# Patient Record
Sex: FEMALE | Race: OTHER | NOT HISPANIC OR LATINO
[De-identification: names, ages, dates, MRNs, and addresses within clinical notes are randomized per-mention and may not be internally consistent; named-entity substitution may affect disease eponyms.]

---

## 2018-03-27 PROBLEM — Z00.00 ENCOUNTER FOR PREVENTIVE HEALTH EXAMINATION: Status: ACTIVE | Noted: 2018-03-27

## 2018-04-11 ENCOUNTER — APPOINTMENT (OUTPATIENT)
Dept: SPINE | Facility: CLINIC | Age: 48
End: 2018-04-11
Payer: COMMERCIAL

## 2018-04-11 VITALS
DIASTOLIC BLOOD PRESSURE: 69 MMHG | HEART RATE: 76 BPM | BODY MASS INDEX: 22.23 KG/M2 | WEIGHT: 140 LBS | HEIGHT: 66.5 IN | SYSTOLIC BLOOD PRESSURE: 107 MMHG

## 2018-04-11 PROCEDURE — 99244 OFF/OP CNSLTJ NEW/EST MOD 40: CPT

## 2018-04-11 RX ORDER — NORETHINDRONE 0.35 MG/1
0.35 TABLET ORAL
Refills: 0 | Status: ACTIVE | COMMUNITY

## 2018-05-30 ENCOUNTER — OTHER (OUTPATIENT)
Age: 48
End: 2018-05-30

## 2018-06-13 ENCOUNTER — APPOINTMENT (OUTPATIENT)
Dept: SPINE | Facility: CLINIC | Age: 48
End: 2018-06-13

## 2018-06-20 ENCOUNTER — APPOINTMENT (OUTPATIENT)
Dept: SPINE | Facility: CLINIC | Age: 48
End: 2018-06-20
Payer: COMMERCIAL

## 2018-06-20 VITALS
HEART RATE: 87 BPM | WEIGHT: 142 LBS | DIASTOLIC BLOOD PRESSURE: 69 MMHG | SYSTOLIC BLOOD PRESSURE: 105 MMHG | HEIGHT: 66.5 IN | BODY MASS INDEX: 22.55 KG/M2

## 2018-06-20 PROCEDURE — 99214 OFFICE O/P EST MOD 30 MIN: CPT

## 2018-06-21 ENCOUNTER — TRANSCRIPTION ENCOUNTER (OUTPATIENT)
Age: 48
End: 2018-06-21

## 2018-08-28 ENCOUNTER — CLINICAL ADVICE (OUTPATIENT)
Age: 48
End: 2018-08-28

## 2018-08-29 ENCOUNTER — APPOINTMENT (OUTPATIENT)
Dept: CT IMAGING | Facility: CLINIC | Age: 48
End: 2018-08-29
Payer: COMMERCIAL

## 2018-08-29 ENCOUNTER — OUTPATIENT (OUTPATIENT)
Dept: OUTPATIENT SERVICES | Facility: HOSPITAL | Age: 48
LOS: 1 days | End: 2018-08-29
Payer: COMMERCIAL

## 2018-08-29 DIAGNOSIS — Z00.8 ENCOUNTER FOR OTHER GENERAL EXAMINATION: ICD-10-CM

## 2018-08-29 PROCEDURE — 70486 CT MAXILLOFACIAL W/O DYE: CPT | Mod: 26

## 2018-08-29 PROCEDURE — 70486 CT MAXILLOFACIAL W/O DYE: CPT

## 2018-10-09 ENCOUNTER — RX RENEWAL (OUTPATIENT)
Age: 48
End: 2018-10-09

## 2018-11-05 ENCOUNTER — CLINICAL ADVICE (OUTPATIENT)
Age: 48
End: 2018-11-05

## 2019-04-10 ENCOUNTER — OTHER (OUTPATIENT)
Age: 49
End: 2019-04-10

## 2019-05-24 ENCOUNTER — RESULT REVIEW (OUTPATIENT)
Age: 49
End: 2019-05-24

## 2019-05-29 ENCOUNTER — APPOINTMENT (OUTPATIENT)
Dept: SPINE | Facility: CLINIC | Age: 49
End: 2019-05-29
Payer: COMMERCIAL

## 2019-05-29 VITALS
HEART RATE: 76 BPM | DIASTOLIC BLOOD PRESSURE: 43 MMHG | WEIGHT: 143 LBS | BODY MASS INDEX: 22.71 KG/M2 | SYSTOLIC BLOOD PRESSURE: 110 MMHG | HEIGHT: 66.5 IN | TEMPERATURE: 98.6 F | RESPIRATION RATE: 16 BRPM

## 2019-05-29 PROCEDURE — 99214 OFFICE O/P EST MOD 30 MIN: CPT

## 2019-06-12 ENCOUNTER — TRANSCRIPTION ENCOUNTER (OUTPATIENT)
Age: 49
End: 2019-06-12

## 2019-06-18 ENCOUNTER — OUTPATIENT (OUTPATIENT)
Dept: OUTPATIENT SERVICES | Facility: HOSPITAL | Age: 49
LOS: 1 days | End: 2019-06-18
Payer: COMMERCIAL

## 2019-06-18 VITALS
RESPIRATION RATE: 16 BRPM | TEMPERATURE: 98 F | WEIGHT: 141.1 LBS | HEIGHT: 67 IN | OXYGEN SATURATION: 99 % | SYSTOLIC BLOOD PRESSURE: 123 MMHG | DIASTOLIC BLOOD PRESSURE: 76 MMHG | HEART RATE: 78 BPM

## 2019-06-18 DIAGNOSIS — Z29.9 ENCOUNTER FOR PROPHYLACTIC MEASURES, UNSPECIFIED: ICD-10-CM

## 2019-06-18 DIAGNOSIS — M54.12 RADICULOPATHY, CERVICAL REGION: ICD-10-CM

## 2019-06-18 DIAGNOSIS — S02.2XXA FRACTURE OF NASAL BONES, INITIAL ENCOUNTER FOR CLOSED FRACTURE: Chronic | ICD-10-CM

## 2019-06-18 DIAGNOSIS — Z98.890 OTHER SPECIFIED POSTPROCEDURAL STATES: Chronic | ICD-10-CM

## 2019-06-18 DIAGNOSIS — M54.12 RADICULOPATHY, CERVICAL REGION: Chronic | ICD-10-CM

## 2019-06-18 DIAGNOSIS — Z98.891 HISTORY OF UTERINE SCAR FROM PREVIOUS SURGERY: Chronic | ICD-10-CM

## 2019-06-18 DIAGNOSIS — Z01.818 ENCOUNTER FOR OTHER PREPROCEDURAL EXAMINATION: ICD-10-CM

## 2019-06-18 DIAGNOSIS — M50.20 OTHER CERVICAL DISC DISPLACEMENT, UNSPECIFIED CERVICAL REGION: ICD-10-CM

## 2019-06-18 LAB
BLD GP AB SCN SERPL QL: NEGATIVE — SIGNIFICANT CHANGE UP
RH IG SCN BLD-IMP: NEGATIVE — SIGNIFICANT CHANGE UP

## 2019-06-18 PROCEDURE — 85027 COMPLETE CBC AUTOMATED: CPT

## 2019-06-18 PROCEDURE — G0463: CPT

## 2019-06-18 PROCEDURE — 87641 MR-STAPH DNA AMP PROBE: CPT

## 2019-06-18 PROCEDURE — 86850 RBC ANTIBODY SCREEN: CPT

## 2019-06-18 PROCEDURE — 86901 BLOOD TYPING SEROLOGIC RH(D): CPT

## 2019-06-18 PROCEDURE — 87640 STAPH A DNA AMP PROBE: CPT

## 2019-06-18 PROCEDURE — 86900 BLOOD TYPING SEROLOGIC ABO: CPT

## 2019-06-18 RX ORDER — CEFAZOLIN SODIUM 1 G
2000 VIAL (EA) INJECTION ONCE
Refills: 0 | Status: DISCONTINUED | OUTPATIENT
Start: 2019-06-27 | End: 2019-06-28

## 2019-06-18 NOTE — H&P PST ADULT - MUSCULOSKELETAL
detailed exam no joint swelling/left hand/ROM intact/no joint erythema/normal strength/diminished strength/no joint warmth/no calf tenderness details…

## 2019-06-18 NOTE — H&P PST ADULT - NSICDXPROBLEM_GEN_ALL_CORE_FT
PROBLEM DIAGNOSES  Problem: Cervical radiculopathy  Assessment and Plan: C4-6 ACDF     Problem: Prophylactic measure  Assessment and Plan: The Caprini score indicates this patient is at risk for a VTE event (score 3-5).  Most surgical patients in this group would benefit from pharmacologic prophylaxis.  The surgical team will determine the balance between VTE risk and bleeding risk

## 2019-06-18 NOTE — H&P PST ADULT - NSICDXPASTSURGICALHX_GEN_ALL_CORE_FT
PAST SURGICAL HISTORY:  Cervical radiculopathy at C6 h/o ACDF     H/O:      History of D&C endometrial thickness    Nasal bone fracture h/o repair    Status post liver transplant with choledochocholedochostomy with stent 2001

## 2019-06-18 NOTE — H&P PST ADULT - MS GEN HX ROS MEA POS PC
You can access the [facility] Patient Portal, offered by [facility], by registering with the following website: [email or web address]
arm pain L/left hand for a year/neck pain

## 2019-06-18 NOTE — H&P PST ADULT - NSICDXPASTMEDICALHX_GEN_ALL_CORE_FT
PAST MEDICAL HISTORY:  Cervical radiculopathy C6-7 ACDF 15 YEARS AGO    UTI (urinary tract infection), bacterial 2017, treated

## 2019-06-18 NOTE — H&P PST ADULT - HISTORY OF PRESENT ILLNESS
48 year old female  with h/o C-C7 ACDF in 2004, reports having neck pain, arm pain with numbness & tingling more in left hand for about a year, being seen in PST for evaluation prior to scheduled C4-6 ACDF on 06/27/19.

## 2019-06-18 NOTE — H&P PST ADULT - NSANTHOSAYNRD_GEN_A_CORE
No. FRITZ screening performed.  STOP BANG Legend: 0-2 = LOW Risk; 3-4 = INTERMEDIATE Risk; 5-8 = HIGH Risk

## 2019-06-18 NOTE — H&P PST ADULT - ASSESSMENT
cervical radiculopathy  EMELIAI VTE 2.0 SCORE [CLOT updated 2019]    AGE RELATED RISK FACTORS                                                       MOBILITY RELATED FACTORS  [ x] Age 41-60 years                                            (1 Point)                    [ ] Bed rest                                                        (1 Point)  [ ] Age: 61-74 years                                           (2 Points)                  [ ] Plaster cast                                                   (2 Points)  [ ] Age= 75 years                                              (3 Points)                    [ ] Bed bound for more than 72 hours                 (2 Points)    DISEASE RELATED RISK FACTORS                                               GENDER SPECIFIC FACTORS  [ ] Edema in the lower extremities                       (1 Point)              [ ] Pregnancy                                                     (1 Point)  [ ] Varicose veins                                               (1 Point)                     [ ] Post-partum < 6 weeks                                   (1 Point)             [ ] BMI > 25 Kg/m2                                            (1 Point)                     [ ] Hormonal therapy  or oral contraception          (1 Point)                 [ ] Sepsis (in the previous month)                        (1 Point)               [ ] History of pregnancy complications                 (1 point)  [ ] Pneumonia or serious lung disease                                               [ ] Unexplained or recurrent                     (1 Point)           (in the previous month)                               (1 Point)  [ ] Abnormal pulmonary function test                     (1 Point)                 SURGERY RELATED RISK FACTORS  [ ] Acute myocardial infarction                              (1 Point)               [ ]  Section                                             (1 Point)  [ ] Congestive heart failure (in the previous month)  (1 Point)      [ ] Minor surgery                                                  (1 Point)   [ ] Inflammatory bowel disease                             (1 Point)               [ ] Arthroscopic surgery                                        (2 Points)  [ ] Central venous access                                      (2 Points)                [x ] General surgery lasting more than 45 minutes (2 points)  [ ] Malignancy- Present or previous                   (2 Points)                [ ] Elective arthroplasty                                         (5 points)    [ ] Stroke (in the previous month)                          (5 Points)                                                                                                                                                           HEMATOLOGY RELATED FACTORS                                                 TRAUMA RELATED RISK FACTORS  [ ] Prior episodes of VTE                                     (3 Points)                [ ] Fracture of the hip, pelvis, or leg                       (5 Points)  [ ] Positive family history for VTE                         (3 Points)             [ ] Acute spinal cord injury (in the previous month)  (5 Points)  [ ] Prothrombin 98729 A                                     (3 Points)               [ ] Paralysis  (less than 1 month)                             (5 Points)  [ ] Factor V Leiden                                             (3 Points)                  [ ] Multiple Trauma within 1 month                        (5 Points)  [ ] Lupus anticoagulants                                     (3 Points)                                                           [ ] Anticardiolipin antibodies                               (3 Points)                                                       [ ] High homocysteine in the blood                      (3 Points)                                             [ ] Other congenital or acquired thrombophilia      (3 Points)                                                [ ] Heparin induced thrombocytopenia                  (3 Points)                                     Total Score [        3  ]

## 2019-06-19 LAB
HCT VFR BLD CALC: 43.9 % — SIGNIFICANT CHANGE UP (ref 34.5–45)
HGB BLD-MCNC: 14.1 G/DL — SIGNIFICANT CHANGE UP (ref 11.5–15.5)
MCHC RBC-ENTMCNC: 31.1 PG — SIGNIFICANT CHANGE UP (ref 27–34)
MCHC RBC-ENTMCNC: 32.1 GM/DL — SIGNIFICANT CHANGE UP (ref 32–36)
MCV RBC AUTO: 96.7 FL — SIGNIFICANT CHANGE UP (ref 80–100)
MRSA PCR RESULT.: SIGNIFICANT CHANGE UP
PLATELET # BLD AUTO: 240 K/UL — SIGNIFICANT CHANGE UP (ref 150–400)
RBC # BLD: 4.54 M/UL — SIGNIFICANT CHANGE UP (ref 3.8–5.2)
RBC # FLD: 12.1 % — SIGNIFICANT CHANGE UP (ref 10.3–14.5)
S AUREUS DNA NOSE QL NAA+PROBE: SIGNIFICANT CHANGE UP
WBC # BLD: 9.55 K/UL — SIGNIFICANT CHANGE UP (ref 3.8–10.5)
WBC # FLD AUTO: 9.55 K/UL — SIGNIFICANT CHANGE UP (ref 3.8–10.5)

## 2019-06-20 PROBLEM — M54.12 RADICULOPATHY, CERVICAL REGION: Chronic | Status: ACTIVE | Noted: 2019-06-18

## 2019-06-20 PROBLEM — N39.0 URINARY TRACT INFECTION, SITE NOT SPECIFIED: Chronic | Status: ACTIVE | Noted: 2019-06-18

## 2019-06-21 ENCOUNTER — APPOINTMENT (OUTPATIENT)
Dept: RADIOLOGY | Facility: CLINIC | Age: 49
End: 2019-06-21
Payer: COMMERCIAL

## 2019-06-21 ENCOUNTER — OUTPATIENT (OUTPATIENT)
Dept: OUTPATIENT SERVICES | Facility: HOSPITAL | Age: 49
LOS: 1 days | End: 2019-06-21
Payer: COMMERCIAL

## 2019-06-21 DIAGNOSIS — Z98.890 OTHER SPECIFIED POSTPROCEDURAL STATES: Chronic | ICD-10-CM

## 2019-06-21 DIAGNOSIS — M50.20 OTHER CERVICAL DISC DISPLACEMENT, UNSPECIFIED CERVICAL REGION: ICD-10-CM

## 2019-06-21 DIAGNOSIS — M54.12 RADICULOPATHY, CERVICAL REGION: Chronic | ICD-10-CM

## 2019-06-21 DIAGNOSIS — S02.2XXA FRACTURE OF NASAL BONES, INITIAL ENCOUNTER FOR CLOSED FRACTURE: Chronic | ICD-10-CM

## 2019-06-21 DIAGNOSIS — Z98.891 HISTORY OF UTERINE SCAR FROM PREVIOUS SURGERY: Chronic | ICD-10-CM

## 2019-06-21 PROCEDURE — 72040 X-RAY EXAM NECK SPINE 2-3 VW: CPT | Mod: 26

## 2019-06-21 PROCEDURE — 72040 X-RAY EXAM NECK SPINE 2-3 VW: CPT

## 2019-06-26 ENCOUNTER — TRANSCRIPTION ENCOUNTER (OUTPATIENT)
Age: 49
End: 2019-06-26

## 2019-06-27 ENCOUNTER — INPATIENT (INPATIENT)
Facility: HOSPITAL | Age: 49
LOS: 0 days | Discharge: HOME | DRG: 472 | End: 2019-06-28
Attending: NEUROLOGICAL SURGERY | Admitting: NEUROLOGICAL SURGERY
Payer: COMMERCIAL

## 2019-06-27 ENCOUNTER — APPOINTMENT (OUTPATIENT)
Dept: SPINE | Facility: HOSPITAL | Age: 49
End: 2019-06-27

## 2019-06-27 ENCOUNTER — APPOINTMENT (OUTPATIENT)
Dept: OTOLARYNGOLOGY | Facility: HOSPITAL | Age: 49
End: 2019-06-27

## 2019-06-27 VITALS
SYSTOLIC BLOOD PRESSURE: 91 MMHG | RESPIRATION RATE: 16 BRPM | HEIGHT: 67 IN | TEMPERATURE: 98 F | HEART RATE: 70 BPM | DIASTOLIC BLOOD PRESSURE: 64 MMHG | OXYGEN SATURATION: 97 % | WEIGHT: 141.98 LBS

## 2019-06-27 DIAGNOSIS — Z98.890 OTHER SPECIFIED POSTPROCEDURAL STATES: Chronic | ICD-10-CM

## 2019-06-27 DIAGNOSIS — M43.22 FUSION OF SPINE, CERVICAL REGION: ICD-10-CM

## 2019-06-27 DIAGNOSIS — S02.2XXA FRACTURE OF NASAL BONES, INITIAL ENCOUNTER FOR CLOSED FRACTURE: Chronic | ICD-10-CM

## 2019-06-27 DIAGNOSIS — M54.12 RADICULOPATHY, CERVICAL REGION: Chronic | ICD-10-CM

## 2019-06-27 DIAGNOSIS — M50.20 OTHER CERVICAL DISC DISPLACEMENT, UNSPECIFIED CERVICAL REGION: ICD-10-CM

## 2019-06-27 DIAGNOSIS — Z98.891 HISTORY OF UTERINE SCAR FROM PREVIOUS SURGERY: Chronic | ICD-10-CM

## 2019-06-27 LAB
ANION GAP SERPL CALC-SCNC: 10 MMOL/L — SIGNIFICANT CHANGE UP (ref 5–17)
BUN SERPL-MCNC: 13 MG/DL — SIGNIFICANT CHANGE UP (ref 7–23)
CALCIUM SERPL-MCNC: 8.1 MG/DL — LOW (ref 8.4–10.5)
CHLORIDE SERPL-SCNC: 105 MMOL/L — SIGNIFICANT CHANGE UP (ref 96–108)
CO2 SERPL-SCNC: 24 MMOL/L — SIGNIFICANT CHANGE UP (ref 22–31)
CREAT SERPL-MCNC: 0.73 MG/DL — SIGNIFICANT CHANGE UP (ref 0.5–1.3)
GLUCOSE SERPL-MCNC: 152 MG/DL — HIGH (ref 70–99)
HCG UR QL: NEGATIVE — SIGNIFICANT CHANGE UP
HCT VFR BLD CALC: 35.4 % — SIGNIFICANT CHANGE UP (ref 34.5–45)
HGB BLD-MCNC: 12.7 G/DL — SIGNIFICANT CHANGE UP (ref 11.5–15.5)
MCHC RBC-ENTMCNC: 34.1 PG — HIGH (ref 27–34)
MCHC RBC-ENTMCNC: 35.9 GM/DL — SIGNIFICANT CHANGE UP (ref 32–36)
MCV RBC AUTO: 94.9 FL — SIGNIFICANT CHANGE UP (ref 80–100)
PLATELET # BLD AUTO: 208 K/UL — SIGNIFICANT CHANGE UP (ref 150–400)
POTASSIUM SERPL-MCNC: 4 MMOL/L — SIGNIFICANT CHANGE UP (ref 3.5–5.3)
POTASSIUM SERPL-SCNC: 4 MMOL/L — SIGNIFICANT CHANGE UP (ref 3.5–5.3)
RBC # BLD: 3.73 M/UL — LOW (ref 3.8–5.2)
RBC # FLD: 11 % — SIGNIFICANT CHANGE UP (ref 10.3–14.5)
SODIUM SERPL-SCNC: 139 MMOL/L — SIGNIFICANT CHANGE UP (ref 135–145)
WBC # BLD: 9.4 K/UL — SIGNIFICANT CHANGE UP (ref 3.8–10.5)
WBC # FLD AUTO: 9.4 K/UL — SIGNIFICANT CHANGE UP (ref 3.8–10.5)

## 2019-06-27 PROCEDURE — 22551 ARTHRD ANT NTRBDY CERVICAL: CPT | Mod: 62

## 2019-06-27 PROCEDURE — 22552 ARTHRD ANT NTRBD CERVICAL EA: CPT | Mod: 62

## 2019-06-27 PROCEDURE — 22853 INSJ BIOMECHANICAL DEVICE: CPT | Mod: 59

## 2019-06-27 RX ORDER — OXYCODONE HYDROCHLORIDE 5 MG/1
5 TABLET ORAL EVERY 6 HOURS
Refills: 0 | Status: DISCONTINUED | OUTPATIENT
Start: 2019-06-27 | End: 2019-06-28

## 2019-06-27 RX ORDER — ACETAMINOPHEN 500 MG
650 TABLET ORAL EVERY 6 HOURS
Refills: 0 | Status: DISCONTINUED | OUTPATIENT
Start: 2019-06-27 | End: 2019-06-28

## 2019-06-27 RX ORDER — LIDOCAINE HCL 20 MG/ML
0.2 VIAL (ML) INJECTION ONCE
Refills: 0 | Status: COMPLETED | OUTPATIENT
Start: 2019-06-27 | End: 2019-06-27

## 2019-06-27 RX ORDER — SODIUM CHLORIDE 9 MG/ML
3 INJECTION INTRAMUSCULAR; INTRAVENOUS; SUBCUTANEOUS EVERY 8 HOURS
Refills: 0 | Status: DISCONTINUED | OUTPATIENT
Start: 2019-06-27 | End: 2019-06-27

## 2019-06-27 RX ORDER — CEFAZOLIN SODIUM 1 G
2000 VIAL (EA) INJECTION EVERY 8 HOURS
Refills: 0 | Status: COMPLETED | OUTPATIENT
Start: 2019-06-27 | End: 2019-06-28

## 2019-06-27 RX ORDER — DEXTROSE MONOHYDRATE, SODIUM CHLORIDE, AND POTASSIUM CHLORIDE 50; .745; 4.5 G/1000ML; G/1000ML; G/1000ML
1000 INJECTION, SOLUTION INTRAVENOUS
Refills: 0 | Status: DISCONTINUED | OUTPATIENT
Start: 2019-06-27 | End: 2019-06-28

## 2019-06-27 RX ORDER — ENOXAPARIN SODIUM 100 MG/ML
40 INJECTION SUBCUTANEOUS DAILY
Refills: 0 | Status: DISCONTINUED | OUTPATIENT
Start: 2019-06-28 | End: 2019-06-28

## 2019-06-27 RX ORDER — ACETAMINOPHEN 500 MG
1000 TABLET ORAL ONCE
Refills: 0 | Status: COMPLETED | OUTPATIENT
Start: 2019-06-27 | End: 2019-06-27

## 2019-06-27 RX ORDER — ONDANSETRON 8 MG/1
4 TABLET, FILM COATED ORAL EVERY 4 HOURS
Refills: 0 | Status: DISCONTINUED | OUTPATIENT
Start: 2019-06-27 | End: 2019-06-27

## 2019-06-27 RX ORDER — NORETHINDRONE 0.35 MG/1
1 TABLET ORAL
Qty: 0 | Refills: 0 | DISCHARGE

## 2019-06-27 RX ORDER — ONDANSETRON 8 MG/1
4 TABLET, FILM COATED ORAL EVERY 8 HOURS
Refills: 0 | Status: DISCONTINUED | OUTPATIENT
Start: 2019-06-27 | End: 2019-06-28

## 2019-06-27 RX ORDER — CHLORHEXIDINE GLUCONATE 213 G/1000ML
1 SOLUTION TOPICAL ONCE
Refills: 0 | Status: COMPLETED | OUTPATIENT
Start: 2019-06-27 | End: 2019-06-27

## 2019-06-27 RX ORDER — HYDROMORPHONE HYDROCHLORIDE 2 MG/ML
0.25 INJECTION INTRAMUSCULAR; INTRAVENOUS; SUBCUTANEOUS
Refills: 0 | Status: DISCONTINUED | OUTPATIENT
Start: 2019-06-27 | End: 2019-06-27

## 2019-06-27 RX ORDER — DIPHENHYDRAMINE HCL 50 MG
12.5 CAPSULE ORAL ONCE
Refills: 0 | Status: COMPLETED | OUTPATIENT
Start: 2019-06-27 | End: 2019-06-27

## 2019-06-27 RX ORDER — FAMOTIDINE 10 MG/ML
20 INJECTION INTRAVENOUS ONCE
Refills: 0 | Status: COMPLETED | OUTPATIENT
Start: 2019-06-27 | End: 2019-06-27

## 2019-06-27 RX ADMIN — OXYCODONE HYDROCHLORIDE 5 MILLIGRAM(S): 5 TABLET ORAL at 13:55

## 2019-06-27 RX ADMIN — Medication 0.2 MILLILITER(S): at 05:56

## 2019-06-27 RX ADMIN — SODIUM CHLORIDE 3 MILLILITER(S): 9 INJECTION INTRAMUSCULAR; INTRAVENOUS; SUBCUTANEOUS at 05:56

## 2019-06-27 RX ADMIN — CHLORHEXIDINE GLUCONATE 1 APPLICATION(S): 213 SOLUTION TOPICAL at 05:56

## 2019-06-27 RX ADMIN — Medication 12.5 MILLIGRAM(S): at 11:04

## 2019-06-27 RX ADMIN — OXYCODONE HYDROCHLORIDE 5 MILLIGRAM(S): 5 TABLET ORAL at 22:50

## 2019-06-27 RX ADMIN — DEXTROSE MONOHYDRATE, SODIUM CHLORIDE, AND POTASSIUM CHLORIDE 75 MILLILITER(S): 50; .745; 4.5 INJECTION, SOLUTION INTRAVENOUS at 11:04

## 2019-06-27 RX ADMIN — Medication 1000 MILLIGRAM(S): at 19:00

## 2019-06-27 RX ADMIN — ONDANSETRON 4 MILLIGRAM(S): 8 TABLET, FILM COATED ORAL at 17:56

## 2019-06-27 RX ADMIN — Medication 400 MILLIGRAM(S): at 18:45

## 2019-06-27 RX ADMIN — FAMOTIDINE 20 MILLIGRAM(S): 10 INJECTION INTRAVENOUS at 11:03

## 2019-06-27 RX ADMIN — Medication 100 MILLIGRAM(S): at 16:52

## 2019-06-27 RX ADMIN — OXYCODONE HYDROCHLORIDE 5 MILLIGRAM(S): 5 TABLET ORAL at 14:25

## 2019-06-27 RX ADMIN — HYDROMORPHONE HYDROCHLORIDE 0.25 MILLIGRAM(S): 2 INJECTION INTRAMUSCULAR; INTRAVENOUS; SUBCUTANEOUS at 12:11

## 2019-06-27 RX ADMIN — HYDROMORPHONE HYDROCHLORIDE 0.25 MILLIGRAM(S): 2 INJECTION INTRAMUSCULAR; INTRAVENOUS; SUBCUTANEOUS at 11:56

## 2019-06-27 RX ADMIN — OXYCODONE HYDROCHLORIDE 5 MILLIGRAM(S): 5 TABLET ORAL at 22:05

## 2019-06-27 NOTE — PROGRESS NOTE ADULT - SUBJECTIVE AND OBJECTIVE BOX
Delphine Hurt is a 48 year old woman who is s/p a C4-6 ACDF today    Patient currently asymptomatic in recovery.    AAO x 3   Following commands  Moving all extremities with good strength  Sensation intact in all extremities  Dressing dry  HMV holding suction    ICU Vital Signs Last 24 Hrs  T(C): 36.8 (27 Jun 2019 10:10), Max: 36.8 (27 Jun 2019 10:10)  T(F): 98.2 (27 Jun 2019 10:10), Max: 98.2 (27 Jun 2019 10:10)  HR: 52 (27 Jun 2019 15:00) (47 - 75)  BP: 98/54 (27 Jun 2019 15:00) (91/64 - 108/58)  BP(mean): 70 (27 Jun 2019 15:00) (69 - 77)  ABP: 90/55 (27 Jun 2019 15:00) (90/55 - 108/63)  ABP(mean): 70 (27 Jun 2019 15:00) (70 - 81)  RR: 18 (27 Jun 2019 15:00) (16 - 18)  SpO2: 96% (27 Jun 2019 15:00) (96% - 99%)      MEDICATIONS  (STANDING):  ceFAZolin   IVPB 2000 milliGRAM(s) IV Intermittent every 8 hours  ceFAZolin   IVPB 2000 milliGRAM(s) IV Intermittent once  sodium chloride 0.9% with potassium chloride 20 mEq/L 1000 milliLiter(s) (75 mL/Hr) IV Continuous <Continuous>    MEDICATIONS  (PRN):  acetaminophen   Tablet .. 650 milliGRAM(s) Oral every 6 hours PRN Mild Pain (1 - 3)  acetaminophen  IVPB .. 1000 milliGRAM(s) IV Intermittent once PRN Severe Pain (7 - 10)  HYDROmorphone  Injectable 0.25 milliGRAM(s) IV Push every 10 minutes PRN Moderate Pain (4 - 6)  ondansetron Injectable 4 milliGRAM(s) IV Push every 4 hours PRN Nausea and/or Vomiting  oxyCODONE    IR 5 milliGRAM(s) Oral every 6 hours PRN Moderate Pain (4 - 6)

## 2019-06-27 NOTE — PHYSICAL THERAPY INITIAL EVALUATION ADULT - ADDITIONAL COMMENTS
As per pt, pt lives in an apartment w/ spouse and 12 steps to enter w/ UHR. PTA pt was independent w/ all mobility and ADLs.

## 2019-06-27 NOTE — PHYSICAL THERAPY INITIAL EVALUATION ADULT - PERTINENT HX OF CURRENT PROBLEM, REHAB EVAL
pt is a 48 year old female  with h/o C-C7 ACDF in 2004, reports having neck pain, arm pain with numbness & tingling more in left hand for about a year. Now s/p C4-6 ACDF on 06/27/19.

## 2019-06-27 NOTE — PHYSICAL THERAPY INITIAL EVALUATION ADULT - PLANNED THERAPY INTERVENTIONS, PT EVAL
balance training/GOAL: Pt will perform 12 stairs with or without U HR as needed within 3-4weeks./gait training/bed mobility training/transfer training

## 2019-06-27 NOTE — PROGRESS NOTE ADULT - SUBJECTIVE AND OBJECTIVE BOX
ENT ISSUE/POD: POD0 s/p ACDF    HPI: 49 y/o female POD0 s/p ACDF. Patient seen at bedside, reports slight voice change and slight dysphagia which the pt contributes to intubation. No other complaints. Conts with drain       PAST MEDICAL & SURGICAL HISTORY:  UTI (urinary tract infection), bacterial: 2017, treated  Cervical radiculopathy: C6-7 ACDF 15 YEARS AGO  Nasal bone fracture: h/o repair  History of D&C: endometrial thickness  H/O:   Cervical radiculopathy at C6: h/o ACDF   Status post liver transplant with choledochocholedochostomy with stent:     Allergies    No Known Allergies    Intolerances      MEDICATIONS  (STANDING):  ceFAZolin   IVPB 2000 milliGRAM(s) IV Intermittent every 8 hours  ceFAZolin   IVPB 2000 milliGRAM(s) IV Intermittent once  sodium chloride 0.9% with potassium chloride 20 mEq/L 1000 milliLiter(s) (75 mL/Hr) IV Continuous <Continuous>    MEDICATIONS  (PRN):  acetaminophen   Tablet .. 650 milliGRAM(s) Oral every 6 hours PRN Mild Pain (1 - 3)  ondansetron Injectable 4 milliGRAM(s) IV Push every 8 hours PRN Nausea and/or Vomiting  oxyCODONE    IR 5 milliGRAM(s) Oral every 6 hours PRN Moderate Pain (4 - 6)    Vital Signs Last 24 Hrs  T(C): 36.4 (2019 20:38), Max: 36.9 (2019 19:38)  T(F): 97.5 (2019 20:38), Max: 98.4 (2019 19:38)  HR: 50 (2019 20:38) (48 - 75)  BP: 98/60 (2019 20:38) (91/64 - 128/72)  BP(mean): 76 (2019 16:15) (69 - 77)  RR: 18 (2019 20:38) (14 - 18)  SpO2: 99% (2019 20:38) (95% - 99%)                          12.7   9.4   )-----------( 208      ( 2019 11:10 )             35.4        139  |  105  |  13  ----------------------------<  152<H>  4.0   |  24  |  0.73    Ca    8.1<L>      2019 11:10         PHYSICAL EXAM:  Gen: NAD  Skin: No rashes, bruises, or lesions  Head: Normocephalic, Atraumatic  Face: no edema, erythema, or fluctuance. Parotid glands soft without mass  Eyes: no scleral injection  Nose: Nares bilaterally patent, no discharge  Mouth: No Stridor / Drooling / Trismus.  Mucosa moist, tongue/uvula midline, oropharynx clear  Neck: Flat, supple, Dressing CDI, drain in place with serosang output,   Lymphatic: No lymphadenopathy  Resp: breathing easily, no stridor  Neuro: facial nerve intact, no facial droop

## 2019-06-27 NOTE — PROGRESS NOTE ADULT - ASSESSMENT
48 year old female s/p C4-6 ACDF    -monitor hmv output  -transfer to floor  -PT  -pain control  -activity as tolerated

## 2019-06-28 ENCOUNTER — TRANSCRIPTION ENCOUNTER (OUTPATIENT)
Age: 49
End: 2019-06-28

## 2019-06-28 VITALS
DIASTOLIC BLOOD PRESSURE: 65 MMHG | HEART RATE: 60 BPM | RESPIRATION RATE: 18 BRPM | OXYGEN SATURATION: 95 % | TEMPERATURE: 98 F | SYSTOLIC BLOOD PRESSURE: 99 MMHG

## 2019-06-28 PROCEDURE — 81025 URINE PREGNANCY TEST: CPT

## 2019-06-28 PROCEDURE — C1713: CPT

## 2019-06-28 PROCEDURE — C1769: CPT

## 2019-06-28 PROCEDURE — C1889: CPT

## 2019-06-28 PROCEDURE — 99024 POSTOP FOLLOW-UP VISIT: CPT

## 2019-06-28 PROCEDURE — 80048 BASIC METABOLIC PNL TOTAL CA: CPT

## 2019-06-28 PROCEDURE — 76000 FLUOROSCOPY <1 HR PHYS/QHP: CPT

## 2019-06-28 PROCEDURE — 85027 COMPLETE CBC AUTOMATED: CPT

## 2019-06-28 PROCEDURE — 97161 PT EVAL LOW COMPLEX 20 MIN: CPT

## 2019-06-28 RX ORDER — OXYCODONE HYDROCHLORIDE 5 MG/1
1 TABLET ORAL
Qty: 30 | Refills: 0
Start: 2019-06-28

## 2019-06-28 RX ORDER — ACETAMINOPHEN 500 MG
2 TABLET ORAL
Qty: 0 | Refills: 0 | DISCHARGE
Start: 2019-06-28

## 2019-06-28 RX ORDER — DEXAMETHASONE 0.5 MG/5ML
4 ELIXIR ORAL EVERY 6 HOURS
Refills: 0 | Status: DISCONTINUED | OUTPATIENT
Start: 2019-06-28 | End: 2019-06-28

## 2019-06-28 RX ADMIN — OXYCODONE HYDROCHLORIDE 5 MILLIGRAM(S): 5 TABLET ORAL at 06:36

## 2019-06-28 RX ADMIN — Medication 100 MILLIGRAM(S): at 08:30

## 2019-06-28 RX ADMIN — Medication 100 MILLIGRAM(S): at 00:13

## 2019-06-28 RX ADMIN — ONDANSETRON 4 MILLIGRAM(S): 8 TABLET, FILM COATED ORAL at 06:36

## 2019-06-28 RX ADMIN — ENOXAPARIN SODIUM 40 MILLIGRAM(S): 100 INJECTION SUBCUTANEOUS at 11:40

## 2019-06-28 RX ADMIN — DEXTROSE MONOHYDRATE, SODIUM CHLORIDE, AND POTASSIUM CHLORIDE 75 MILLILITER(S): 50; .745; 4.5 INJECTION, SOLUTION INTRAVENOUS at 00:46

## 2019-06-28 RX ADMIN — Medication 4 MILLIGRAM(S): at 10:52

## 2019-06-28 NOTE — DISCHARGE NOTE PROVIDER - NSDCACTIVITY_GEN_ALL_CORE
Showering allowed/Do not make important decisions/Walking - Outdoors allowed/Stairs allowed/Do not drive or operate machinery/Walking - Indoors allowed/No heavy lifting/straining

## 2019-06-28 NOTE — PROGRESS NOTE ADULT - SUBJECTIVE AND OBJECTIVE BOX
Pt seen and examined  Doing well  hands and arms feel better\  PARNELL 5/5    incision C & D  hemovac d/c'd    D/c Home

## 2019-06-28 NOTE — CHART NOTE - NSCHARTNOTEFT_GEN_A_CORE
REYNA [id number]      Drain type: []SD []SG [] BINTA [x] HMV [] Lumbar drain [] EVD [] ICP [place] [] Abd drain     Patient's position while drain removed: flat     [x] Patient tolerated well [x] No complications [] complications:     Additional Info: Asked by neurosurgery team to remove drain. Patient tolerated procedure well.

## 2019-06-28 NOTE — PROGRESS NOTE ADULT - PROBLEM SELECTOR PLAN 1
-Management per Neurosurgery  -Monitor for any voice changes and worsening dysphagia  -Will continue to follow  -Please call ENT with any acute changes -Management per Neurosurgery  -Monitor for any voice changes and worsening dysphagia  -Continue diet as tolerated  -Follow up BINTA output  -Will continue to follow  -Please call ENT with any acute changes

## 2019-06-28 NOTE — DISCHARGE NOTE PROVIDER - HOSPITAL COURSE
48 year old female  with h/o C-C7 ACDF in 2004, reports having neck pain, arm pain with numbness & tingling more in left hand for about a year, being seen in PST for evaluation prior to scheduled C4-6 ACDF on 06/27/19. She is s/p C4-6 ACDF w ENT. Her post op course was uncomplicated. She was seen by PT and they recommended outpatient PT. At the time of discharge patient is neurologically and hemodynamically stable and clear for discharge home.

## 2019-06-28 NOTE — PROGRESS NOTE ADULT - ASSESSMENT
48 year old female  with h/o C-C7 ACDF in 2004, reports having neck pain, arm pain with numbness & tingling more in left hand for about a year, being seen in PST for evaluation prior to scheduled C4-6 ACDF on 06/27/19. (18 Jun 2019 15:44)    PROCEDURE:  6/27 s/p C4-6 ACDF   POD#1    PLAN:  -Neuro: neuro stable. DC home on medrol dose pack as per Dr. Gonzalez  -Encouraged incentive spirometry   -Oxy IR for pain control  -DVT ppx: venodynes and sql  -PT: outpatient PT; will dc home as discussed with Dr. Gonzalez    Above discussed with Dr. Gonzalez  Spectralink #[id number]     Assessment:  Please Check When Present   []  GCS  E   V  M     Heart Failure: []Acute, [] acute on chronic , []chronic  Heart Failure:  [] Diastolic (HFpEF), [] Systolic (HFrEF), []Combined (HFpEF and HFrEF), [] RHF, [] Pulm HTN, [] Other    [] ZAFAR, [] ATN, [] AIN, [] other  [] CKD1, [] CKD2, [] CKD 3, [] CKD 4, [] CKD 5, []ESRD    Encephalopathy: [] Metabolic, [] Hepatic, [] toxic, [] Neurological, [] Other    Abnormal Nurtitional Status: [] malnurtition (see nutrition note), [ ]underweight: BMI < 19, [] morbid obesity: BMI >40, [] Cachexia    [] Sepsis  [] hypovolemic shock,[] cardiogenic shock, [] hemorrhagic shock, [] neuogenic shock  [] Acute Respiratory Failure  []Cerebral edema, [] Brain compression/ herniation,   [] Functional quadriplegia  [] Acute blood loss anemia

## 2019-06-28 NOTE — PROGRESS NOTE ADULT - SUBJECTIVE AND OBJECTIVE BOX
SUBJECTIVE: Patient seen and examined at bedside with Dr. Gonzalez. Her drain was removed earlier today.     OVERNIGHT EVENTS: none     Vital Signs Last 24 Hrs  T(C): 36.8 (28 Jun 2019 08:30), Max: 36.9 (27 Jun 2019 19:38)  T(F): 98.2 (28 Jun 2019 08:30), Max: 98.4 (27 Jun 2019 19:38)  HR: 60 (28 Jun 2019 08:30) (48 - 71)  BP: 99/65 (28 Jun 2019 08:30) (94/59 - 128/72)  BP(mean): 76 (27 Jun 2019 16:15) (69 - 77)  RR: 18 (28 Jun 2019 08:30) (14 - 18)  SpO2: 95% (28 Jun 2019 08:30) (95% - 100%)    PHYSICAL EXAM:    General: No Acute Distress     Neurological: Awake, alert oriented to person, place and time, Following Commands, PERRL, EOMI, Face Symmetrical, Speech Fluent, Moving all extremities, Muscle Strength normal in all four extremities, No Drift, Sensation to Light Touch Intact    Pulmonary: Clear to Auscultation, No Rales, No Rhonchi, No Wheezes     Cardiovascular: S1, S2, Regular Rate and Rhythm     Gastrointestinal: Soft, Nontender, Nondistended     Incision: c/d/i     LABS:                        12.7   9.4   )-----------( 208      ( 27 Jun 2019 11:10 )             35.4    06-27    139  |  105  |  13  ----------------------------<  152<H>  4.0   |  24  |  0.73    Ca    8.1<L>      27 Jun 2019 11:10      DIET: regular diet     IMAGING: no new imaging

## 2019-06-28 NOTE — DISCHARGE NOTE NURSING/CASE MANAGEMENT/SOCIAL WORK - NSDCDPATPORTLINK_GEN_ALL_CORE
You can access the [facility] Patient Portal, offered by [facility], by registering with the following website: [email or web address]

## 2019-06-28 NOTE — DISCHARGE NOTE PROVIDER - NSDCFUADDINST_GEN_ALL_CORE_FT
please keep incision clean and dry, do not submerge wound in water for prolonged periods of time, pat dry after showering, and do not use any creams or ointments to incision.   NO heavy lifting, strenous activity, twisting, bending, driving, or working until cleared by your physician.  Please return to the emergency department if you develop changes in mental status, seizures, fainting, dizziness, changes in vision, lethargy, nausea, vomiting, chest pain, shortness of breathe or severe pain.   Please follow up Neurosurgeon in one week. Please call office to make appointment. Please follow up with Primary Care Physician. Please call office to make appointment.

## 2019-06-28 NOTE — PROGRESS NOTE ADULT - ASSESSMENT
47 y/o female POD #1 s/p C4-6 ACDF. Patient is stable and recovering appropriately post-operatively.

## 2019-06-28 NOTE — DISCHARGE NOTE PROVIDER - CARE PROVIDER_API CALL
Jonn Gonzalez)  Neurological Surgery  [address] [place] [address], [address]  [place] [address]  Phone: [phone]  Fax: [phone]  Follow Up Time:

## 2019-06-28 NOTE — PROGRESS NOTE ADULT - SUBJECTIVE AND OBJECTIVE BOX
POD/STATUS POST/ENT ISSUE: POD #1 s/p ACDF    INTERVAL HPI: HPI: 47 y/o female POD #1 s/p ACDF. Patient seen and examined at bedside. No acute ENT events over night. Patient reports slight voice change and dysphagia which the pt contributes to intubation. No other complaints. Drain in place.      Vital Signs Last 24 Hrs  T(C): 36.6 (28 Jun 2019 04:53), Max: 36.9 (27 Jun 2019 19:38)  T(F): 97.8 (28 Jun 2019 04:53), Max: 98.4 (27 Jun 2019 19:38)  HR: 63 (28 Jun 2019 04:53) (48 - 75)  BP: 105/57 (28 Jun 2019 04:53) (91/64 - 128/72)  BP(mean): 76 (27 Jun 2019 16:15) (69 - 77)  RR: 18 (28 Jun 2019 04:53) (14 - 18)  SpO2: 100% (28 Jun 2019 04:53) (95% - 100%)    PHYSICAL EXAM:  Gen: NAD  Skin: No rashes, bruises, or lesions  Head: Normocephalic, Atraumatic  Face: no edema, erythema, or fluctuance. Parotid glands soft without mass  Eyes: no scleral injection  Nose: Nares bilaterally patent, no discharge  Mouth: No Stridor / Drooling / Trismus.  Mucosa moist, tongue/uvula midline, oropharynx clear  Neck: Flat, supple, Dressing CDI, drain in place with serosanguinous output,   Lymphatic: No lymphadenopathy  Resp: unlabored respirations, no stridor  Neuro: facial nerve intact, no facial droop      LABS:                        12.7   9.4   )-----------( 208      ( 27 Jun 2019 11:10 )             35.4       IMAGING/ADDITIONAL STUDIES: POD/STATUS POST/ENT ISSUE: POD #1 s/p ACDF    INTERVAL HPI: 49 y/o female POD #1 s/p ACDF. Patient seen and examined at bedside. No acute ENT events over night. Patient reports slight voice change and dysphagia which the pt contributes to intubation, has improved. Tolerating diet. No other complaints. Drain in place.      Vital Signs Last 24 Hrs  T(C): 36.6 (28 Jun 2019 04:53), Max: 36.9 (27 Jun 2019 19:38)  T(F): 97.8 (28 Jun 2019 04:53), Max: 98.4 (27 Jun 2019 19:38)  HR: 63 (28 Jun 2019 04:53) (48 - 75)  BP: 105/57 (28 Jun 2019 04:53) (91/64 - 128/72)  BP(mean): 76 (27 Jun 2019 16:15) (69 - 77)  RR: 18 (28 Jun 2019 04:53) (14 - 18)  SpO2: 100% (28 Jun 2019 04:53) (95% - 100%)    PHYSICAL EXAM:  Gen: NAD  Skin: No rashes, bruises, or lesions  Head: Normocephalic, Atraumatic  Face: no edema, erythema, or fluctuance. Parotid glands soft without mass  Eyes: no scleral injection  Nose: Nares bilaterally patent, no discharge  Mouth: No Stridor / Drooling / Trismus.  Mucosa moist, tongue/uvula midline, oropharynx clear  Neck: Flat, supple, Dressing CDI, drain in place with serosanguinous output.  Lymphatic: No lymphadenopathy  Resp: unlabored respirations, no stridor  Neuro: facial nerve intact, no facial droop      LABS:                        12.7   9.4   )-----------( 208      ( 27 Jun 2019 11:10 )             35.4       IMAGING/ADDITIONAL STUDIES:

## 2019-06-28 NOTE — CHART NOTE - NSCHARTNOTEFT_GEN_A_CORE
CAPRINI SCORE [CLOT] Score on Admission for     AGE RELATED RISK FACTORS                                                       MOBILITY RELATED FACTORS  [x ] Age 41-60 years                                            (1 Point)                  [ ] Bed rest                                                        (1 Point)  [ ] Age: 61-74 years                                           (2 Points)                 [ ] Plaster cast                                                   (2 Points)  [ ] Age= 75 years                                              (3 Points)                 [ ] Bed bound for more than 72 hours                 (2 Points)    DISEASE RELATED RISK FACTORS                                               GENDER SPECIFIC FACTORS  [ ] Edema in the lower extremities                       (1 Point)                  [ ] Pregnancy                                                     (1 Point)  [ ] Varicose veins                                               (1 Point)                  [ ] Post-partum < 6 weeks                                   (1 Point)             [ ] BMI > 25 Kg/m2                                            (1 Point)                  [ ] Hormonal therapy  or oral contraception          (1 Point)                 [ ] Sepsis (in the previous month)                        (1 Point)                  [ ] History of pregnancy complications                 (1 point)  [ ] Pneumonia or serious lung disease                                               [ ] Unexplained or recurrent                     (1 Point)           (in the previous month)                               (1 Point)  [ ] Abnormal pulmonary function test                     (1 Point)                 SURGERY RELATED RISK FACTORS (include planned surgeries)  [ ] Acute myocardial infarction                              (1 Point)                 [ ]  Section                                             (1 Point)  [ ] Congestive heart failure (in the previous month)  (1 Point)         [ ] Minor surgery                                                  (1 Point)   [ ] Inflammatory bowel disease                             (1 Point)                 [ ] Arthroscopic surgery                                        (2 Points)  [ ] Central venous access                                      (2 Points)                [ x] General surgery lasting more than 45 minutes   (2 Points)       [ ] Stroke (in the previous month)                          (5 Points)               [ ] Elective arthroplasty                                         (5 Points)            [ ] current or past malignancy                              (2 Points)                                                                                                       HEMATOLOGY RELATED FACTORS                                                 TRAUMA RELATED RISK FACTORS  [ ] Prior episodes of VTE                                     (3 Points)                [ ] Fracture of the hip, pelvis, or leg                       (5 Points)  [ ] Positive family history for VTE                         (3 Points)                 [ ] Acute spinal cord injury (in the previous month)  (5 Points)  [ ] Prothrombin 80841 A                                     (3 Points)                 [ ] Paralysis  (less than 1 month)                             (5 Points)  [ ] Factor V Leiden                                             (3 Points)                  [ ] Multiple Trauma within 1 month                        (5 Points)  [ ] Lupus anticoagulants                                     (3 Points)                                                           [ ] Anticardiolipin antibodies                               (3 Points)                                                       [ ] High homocysteine in the blood                      (3 Points)                                             [ ] Other congenital or acquired thrombophilia      (3 Points)                                                [ ] Heparin induced thrombocytopenia                  (3 Points)                                          Total Score [       2   ]    Risk:  Very low 0   Low 1 to 2   Moderate 3 to 4   High =5       VTE Prophylasix Recommednations:  [ x] mechanical pneumatic compression devices                                      [ ] contraindicated: _____________________  [x ] chemo prophylasix                                                                                   [ ] contraindicated _____________________    **** HIGH LIKELIHOOD DVT PRESENT ON ADMISSION  [ ] (please order LE dopplers within 24 hours of admission)

## 2019-06-28 NOTE — DISCHARGE NOTE PROVIDER - NSDCCPCAREPLAN_GEN_ALL_CORE_FT
PRINCIPAL DISCHARGE DIAGNOSIS  Diagnosis: Cervical radiculopathy  Assessment and Plan of Treatment: 6/28 C4-6 ACDF   Please follow up Neurosurgeon in one week. Please call office to make appointment. Please follow up with Primary Care Physician. Please call office to make appointment.

## 2019-07-05 ENCOUNTER — APPOINTMENT (OUTPATIENT)
Dept: SPINE | Facility: CLINIC | Age: 49
End: 2019-07-05
Payer: COMMERCIAL

## 2019-07-05 DIAGNOSIS — Z78.9 OTHER SPECIFIED HEALTH STATUS: ICD-10-CM

## 2019-07-05 PROCEDURE — 99024 POSTOP FOLLOW-UP VISIT: CPT

## 2019-07-05 NOTE — PHYSICAL EXAM
[General Appearance - Alert] : alert [General Appearance - Well-Appearing] : healthy appearing [General Appearance - Well Nourished] : well nourished [General Appearance - In No Acute Distress] : in no acute distress [Clean] : clean [Transverse] : transverse [Dry] : dry [Healing Well] : healing well [No Drainage] : without drainage [Normal Skin] : normal [Person] : oriented to person [Oriented To Time, Place, And Person] : oriented to person, place, and time [Time] : oriented to time [Place] : oriented to place [Cranial Nerves Oculomotor (III)] : extraocular motion intact [Cranial Nerves Facial (VII)] : face symmetrical [Cranial Nerves Trigeminal (V)] : facial sensation intact symmetrically [Cranial Nerves Optic (II)] : visual acuity intact bilaterally,  pupils equal round and reactive to light [Cranial Nerves Accessory (XI - Cranial And Spinal)] : head turning and shoulder shrug symmetric [Cranial Nerves Glossopharyngeal (IX)] : tongue and palate midline [Cranial Nerves Hypoglossal (XII)] : there was no tongue deviation with protrusion [Cranial Nerves Vestibulocochlear (VIII)] : hearing was intact bilaterally [Motor Tone] : muscle tone was normal in all four extremities [4] : C7 triceps 4/5 [5] : L4/5 ankle dorsiflexors 5/5 [Balance] : balance was intact [3+] : Patella left 3+ [2+] : Patella right 2+ [Neck Appearance] : the appearance of the neck was normal [Exaggerated Use Of Accessory Muscles For Inspiration] : no accessory muscle use [Auscultation Breath Sounds / Voice Sounds] : lungs were clear to auscultation bilaterally [Edema] : there was no peripheral edema [Heart Rate And Rhythm] : heart rate was normal and rhythm regular [Heart Sounds] : normal S1 and S2 [No CVA Tenderness] : no ~M costovertebral angle tenderness [Bowel Sounds] : normal bowel sounds [No Spinal Tenderness] : no spinal tenderness [] : no rash [Abnormal Walk] : normal gait [Skin Color & Pigmentation] : normal skin color and pigmentation [Erythema] : not erythematous [Tender] : not tender [Warm] : not warm [Indurated] : not indurated [FreeTextEntry1] : left sided anterior cervical  [Dysesthesia] : no dysesthesia

## 2019-07-05 NOTE — REASON FOR VISIT
[de-identified] : C4-C5, C5-C6 anterior diskectomy.\par  Placement of intervertebral device at C4-C5, C5 C6.\par  Anterior arthrodesis C4-C5, C5-C6.\par  Anterior instrumentation C4, C5 C6.\par  [de-identified] : 6-

## 2019-07-05 NOTE — PROCEDURE
[FreeTextEntry1] : Steri-strip removed from cervical incision\par reviewed care if incision and activity levels

## 2019-07-09 ENCOUNTER — CLINICAL ADVICE (OUTPATIENT)
Age: 49
End: 2019-07-09

## 2019-07-13 ENCOUNTER — APPOINTMENT (OUTPATIENT)
Dept: RADIOLOGY | Facility: CLINIC | Age: 49
End: 2019-07-13
Payer: COMMERCIAL

## 2019-07-13 ENCOUNTER — OUTPATIENT (OUTPATIENT)
Dept: OUTPATIENT SERVICES | Facility: HOSPITAL | Age: 49
LOS: 1 days | End: 2019-07-13
Payer: COMMERCIAL

## 2019-07-13 DIAGNOSIS — S02.2XXA FRACTURE OF NASAL BONES, INITIAL ENCOUNTER FOR CLOSED FRACTURE: Chronic | ICD-10-CM

## 2019-07-13 DIAGNOSIS — Z98.891 HISTORY OF UTERINE SCAR FROM PREVIOUS SURGERY: Chronic | ICD-10-CM

## 2019-07-13 DIAGNOSIS — Z98.890 OTHER SPECIFIED POSTPROCEDURAL STATES: Chronic | ICD-10-CM

## 2019-07-13 DIAGNOSIS — Z00.8 ENCOUNTER FOR OTHER GENERAL EXAMINATION: ICD-10-CM

## 2019-07-13 DIAGNOSIS — M54.12 RADICULOPATHY, CERVICAL REGION: Chronic | ICD-10-CM

## 2019-07-13 PROCEDURE — 72040 X-RAY EXAM NECK SPINE 2-3 VW: CPT

## 2019-07-13 PROCEDURE — 72040 X-RAY EXAM NECK SPINE 2-3 VW: CPT | Mod: 26

## 2019-07-17 ENCOUNTER — APPOINTMENT (OUTPATIENT)
Dept: SPINE | Facility: CLINIC | Age: 49
End: 2019-07-17
Payer: COMMERCIAL

## 2019-07-17 VITALS
HEART RATE: 65 BPM | SYSTOLIC BLOOD PRESSURE: 114 MMHG | DIASTOLIC BLOOD PRESSURE: 72 MMHG | TEMPERATURE: 98.3 F | HEIGHT: 66.5 IN | RESPIRATION RATE: 16 BRPM | OXYGEN SATURATION: 99 % | BODY MASS INDEX: 22.71 KG/M2 | WEIGHT: 143 LBS

## 2019-07-17 PROCEDURE — 99024 POSTOP FOLLOW-UP VISIT: CPT

## 2019-07-20 ENCOUNTER — APPOINTMENT (OUTPATIENT)
Dept: MRI IMAGING | Facility: CLINIC | Age: 49
End: 2019-07-20
Payer: COMMERCIAL

## 2019-07-20 ENCOUNTER — OUTPATIENT (OUTPATIENT)
Dept: OUTPATIENT SERVICES | Facility: HOSPITAL | Age: 49
LOS: 1 days | End: 2019-07-20
Payer: COMMERCIAL

## 2019-07-20 DIAGNOSIS — Z98.890 OTHER SPECIFIED POSTPROCEDURAL STATES: Chronic | ICD-10-CM

## 2019-07-20 DIAGNOSIS — S02.2XXA FRACTURE OF NASAL BONES, INITIAL ENCOUNTER FOR CLOSED FRACTURE: Chronic | ICD-10-CM

## 2019-07-20 DIAGNOSIS — Z98.891 HISTORY OF UTERINE SCAR FROM PREVIOUS SURGERY: Chronic | ICD-10-CM

## 2019-07-20 DIAGNOSIS — Z98.1 ARTHRODESIS STATUS: ICD-10-CM

## 2019-07-20 DIAGNOSIS — M54.12 RADICULOPATHY, CERVICAL REGION: Chronic | ICD-10-CM

## 2019-07-20 PROCEDURE — 72141 MRI NECK SPINE W/O DYE: CPT | Mod: 26

## 2019-07-20 PROCEDURE — 72141 MRI NECK SPINE W/O DYE: CPT

## 2019-07-22 ENCOUNTER — OTHER (OUTPATIENT)
Age: 49
End: 2019-07-22

## 2019-08-07 ENCOUNTER — APPOINTMENT (OUTPATIENT)
Dept: SPINE | Facility: CLINIC | Age: 49
End: 2019-08-07
Payer: COMMERCIAL

## 2019-08-07 VITALS
BODY MASS INDEX: 19.37 KG/M2 | HEART RATE: 65 BPM | HEIGHT: 72 IN | SYSTOLIC BLOOD PRESSURE: 104 MMHG | WEIGHT: 143 LBS | DIASTOLIC BLOOD PRESSURE: 71 MMHG

## 2019-08-07 PROCEDURE — 99024 POSTOP FOLLOW-UP VISIT: CPT

## 2019-08-07 RX ORDER — CYCLOBENZAPRINE HCL 5 MG
5 TABLET ORAL 3 TIMES DAILY
Refills: 0 | Status: DISCONTINUED | COMMUNITY
End: 2019-08-07

## 2019-08-07 RX ORDER — DIAZEPAM 5 MG/1
5 TABLET ORAL EVERY 8 HOURS
Qty: 21 | Refills: 0 | Status: DISCONTINUED | COMMUNITY
Start: 2019-07-09 | End: 2019-08-07

## 2019-08-07 RX ORDER — METHYLPREDNISOLONE 4 MG/1
4 TABLET ORAL
Qty: 1 | Refills: 0 | Status: DISCONTINUED | COMMUNITY
Start: 2019-07-17 | End: 2019-08-07

## 2019-09-07 ENCOUNTER — EMERGENCY (EMERGENCY)
Facility: HOSPITAL | Age: 49
LOS: 1 days | Discharge: HOME | End: 2019-09-07
Attending: EMERGENCY MEDICINE
Payer: COMMERCIAL

## 2019-09-07 VITALS
RESPIRATION RATE: 16 BRPM | OXYGEN SATURATION: 100 % | DIASTOLIC BLOOD PRESSURE: 59 MMHG | SYSTOLIC BLOOD PRESSURE: 102 MMHG | TEMPERATURE: 99 F | HEART RATE: 55 BPM

## 2019-09-07 VITALS
HEIGHT: 67 IN | RESPIRATION RATE: 18 BRPM | HEART RATE: 110 BPM | OXYGEN SATURATION: 100 % | SYSTOLIC BLOOD PRESSURE: 156 MMHG | WEIGHT: 149.91 LBS | TEMPERATURE: 98 F | DIASTOLIC BLOOD PRESSURE: 75 MMHG

## 2019-09-07 DIAGNOSIS — Z98.891 HISTORY OF UTERINE SCAR FROM PREVIOUS SURGERY: Chronic | ICD-10-CM

## 2019-09-07 DIAGNOSIS — S02.2XXA FRACTURE OF NASAL BONES, INITIAL ENCOUNTER FOR CLOSED FRACTURE: Chronic | ICD-10-CM

## 2019-09-07 DIAGNOSIS — Z98.890 OTHER SPECIFIED POSTPROCEDURAL STATES: Chronic | ICD-10-CM

## 2019-09-07 DIAGNOSIS — M54.12 RADICULOPATHY, CERVICAL REGION: Chronic | ICD-10-CM

## 2019-09-07 LAB
ALBUMIN SERPL ELPH-MCNC: 4.4 G/DL — SIGNIFICANT CHANGE UP (ref 3.3–5)
ALP SERPL-CCNC: 46 U/L — SIGNIFICANT CHANGE UP (ref 40–120)
ALT FLD-CCNC: 18 U/L — SIGNIFICANT CHANGE UP (ref 10–45)
ANION GAP SERPL CALC-SCNC: 12 MMOL/L — SIGNIFICANT CHANGE UP (ref 5–17)
AST SERPL-CCNC: 25 U/L — SIGNIFICANT CHANGE UP (ref 10–40)
BASOPHILS # BLD AUTO: 0.1 K/UL — SIGNIFICANT CHANGE UP (ref 0–0.2)
BASOPHILS NFR BLD AUTO: 0.5 % — SIGNIFICANT CHANGE UP (ref 0–2)
BILIRUB SERPL-MCNC: 0.5 MG/DL — SIGNIFICANT CHANGE UP (ref 0.2–1.2)
BUN SERPL-MCNC: 11 MG/DL — SIGNIFICANT CHANGE UP (ref 7–23)
CALCIUM SERPL-MCNC: 9.6 MG/DL — SIGNIFICANT CHANGE UP (ref 8.4–10.5)
CHLORIDE SERPL-SCNC: 105 MMOL/L — SIGNIFICANT CHANGE UP (ref 96–108)
CO2 SERPL-SCNC: 25 MMOL/L — SIGNIFICANT CHANGE UP (ref 22–31)
CREAT SERPL-MCNC: 0.73 MG/DL — SIGNIFICANT CHANGE UP (ref 0.5–1.3)
EOSINOPHIL # BLD AUTO: 0.2 K/UL — SIGNIFICANT CHANGE UP (ref 0–0.5)
EOSINOPHIL NFR BLD AUTO: 2 % — SIGNIFICANT CHANGE UP (ref 0–6)
GLUCOSE SERPL-MCNC: 108 MG/DL — HIGH (ref 70–99)
HCT VFR BLD CALC: 41.9 % — SIGNIFICANT CHANGE UP (ref 34.5–45)
HGB BLD-MCNC: 14.1 G/DL — SIGNIFICANT CHANGE UP (ref 11.5–15.5)
LYMPHOCYTES # BLD AUTO: 16.9 % — SIGNIFICANT CHANGE UP (ref 13–44)
LYMPHOCYTES # BLD AUTO: 2.1 K/UL — SIGNIFICANT CHANGE UP (ref 1–3.3)
MCHC RBC-ENTMCNC: 32.5 PG — SIGNIFICANT CHANGE UP (ref 27–34)
MCHC RBC-ENTMCNC: 33.6 GM/DL — SIGNIFICANT CHANGE UP (ref 32–36)
MCV RBC AUTO: 96.7 FL — SIGNIFICANT CHANGE UP (ref 80–100)
MONOCYTES # BLD AUTO: 0.9 K/UL — SIGNIFICANT CHANGE UP (ref 0–0.9)
MONOCYTES NFR BLD AUTO: 7 % — SIGNIFICANT CHANGE UP (ref 2–14)
NEUTROPHILS # BLD AUTO: 9 K/UL — HIGH (ref 1.8–7.4)
NEUTROPHILS NFR BLD AUTO: 73.6 % — SIGNIFICANT CHANGE UP (ref 43–77)
PLATELET # BLD AUTO: 239 K/UL — SIGNIFICANT CHANGE UP (ref 150–400)
POTASSIUM SERPL-MCNC: 4.1 MMOL/L — SIGNIFICANT CHANGE UP (ref 3.5–5.3)
POTASSIUM SERPL-SCNC: 4.1 MMOL/L — SIGNIFICANT CHANGE UP (ref 3.5–5.3)
PROT SERPL-MCNC: 7.1 G/DL — SIGNIFICANT CHANGE UP (ref 6–8.3)
RBC # BLD: 4.34 M/UL — SIGNIFICANT CHANGE UP (ref 3.8–5.2)
RBC # FLD: 11.1 % — SIGNIFICANT CHANGE UP (ref 10.3–14.5)
SODIUM SERPL-SCNC: 142 MMOL/L — SIGNIFICANT CHANGE UP (ref 135–145)
TROPONIN T, HIGH SENSITIVITY RESULT: <6 NG/L — SIGNIFICANT CHANGE UP (ref 0–51)
TROPONIN T, HIGH SENSITIVITY RESULT: <6 NG/L — SIGNIFICANT CHANGE UP (ref 0–51)
WBC # BLD: 12.3 K/UL — HIGH (ref 3.8–10.5)
WBC # FLD AUTO: 12.3 K/UL — HIGH (ref 3.8–10.5)

## 2019-09-07 PROCEDURE — 99285 EMERGENCY DEPT VISIT HI MDM: CPT

## 2019-09-07 PROCEDURE — 71046 X-RAY EXAM CHEST 2 VIEWS: CPT

## 2019-09-07 PROCEDURE — 80053 COMPREHEN METABOLIC PANEL: CPT

## 2019-09-07 PROCEDURE — 85027 COMPLETE CBC AUTOMATED: CPT

## 2019-09-07 PROCEDURE — 84484 ASSAY OF TROPONIN QUANT: CPT

## 2019-09-07 PROCEDURE — 71046 X-RAY EXAM CHEST 2 VIEWS: CPT | Mod: 26

## 2019-09-07 PROCEDURE — 99284 EMERGENCY DEPT VISIT MOD MDM: CPT | Mod: 25

## 2019-09-07 RX ORDER — LIDOCAINE 4 G/100G
10 CREAM TOPICAL ONCE
Refills: 0 | Status: COMPLETED | OUTPATIENT
Start: 2019-09-07 | End: 2019-09-07

## 2019-09-07 RX ORDER — ASPIRIN/CALCIUM CARB/MAGNESIUM 324 MG
324 TABLET ORAL ONCE
Refills: 0 | Status: COMPLETED | OUTPATIENT
Start: 2019-09-07 | End: 2019-09-07

## 2019-09-07 RX ADMIN — Medication 30 MILLILITER(S): at 13:13

## 2019-09-07 RX ADMIN — Medication 324 MILLIGRAM(S): at 13:14

## 2019-09-07 RX ADMIN — LIDOCAINE 10 MILLILITER(S): 4 CREAM TOPICAL at 13:14

## 2019-09-07 NOTE — ED PROVIDER NOTE - NSFOLLOWUPCLINICS_GEN_ALL_ED_FT
[facility]  Cardiology  [address] [place] [address]  Phone: [phone]  Fax:   Follow Up Time: 4-6 Days

## 2019-09-07 NOTE — ED ADULT NURSE NOTE - OBJECTIVE STATEMENT
c/o epigastric pain and tingling/numbness to upper lip area since around 1100 today. Pain is constant and sharp in nature, radiating to her back. During time of exam patient reports the parasthesias to her face are improving. Pain is non-reproducible. Denies any recent illness, vomiting or diarrhea, denies fever or chills. Patient also has pain to her neck, back and arms since having spinal surgery two months ago for bulging discs and spinal stenosis. Breath sounds are clear on auscultation, abdomen is soft, non-distended and non-tender. Skin is warm dry and intact. EKG done in triage and patient placed on telemetry monitoring. NSR noted on monitor.

## 2019-09-07 NOTE — ED PROVIDER NOTE - NS ED ROS FT
CONSTITUTIONAL: no fevers  HEENT: no dysphagia  CV: + chest pain  RESP: no SOB  GI: no nausea/vomiting  : no dysuria  DERM: no rash  MSK: + neck pain  NEURO: no LOC  ENDO: no diabetes

## 2019-09-07 NOTE — ED ADULT NURSE NOTE - PSH
Cervical radiculopathy at C6  h/o ACDF   H/O:     History of D&C  endometrial thickness  Nasal bone fracture  h/o repair  Status post liver transplant with choledochocholedochostomy with stent  2001

## 2019-09-07 NOTE — ED PROVIDER NOTE - NSFOLLOWUPINSTRUCTIONS_ED_ALL_ED_FT
Nonspecific Chest Pain    You were given rapid cardiology follow up. Please follow up as discussed.     Chest pain can be caused by many different conditions. It can be caused by something serious that needs treatment right away. This includes:  Heart attack.  A tear in the body's main blood vessel.  Redness and swelling (inflammation) around your heart.  Blood clot in your lungs.  It can be caused by something that is not as serious. This includes:  Heartburn.  Anxiety or stress.  Damage to bones or muscles in your chest.  Lung infections.  See your doctor right away if you have chest pain. This is important.    Follow these instructions at home:  Medicines     Take over-the-counter and prescription medicines only as told by your doctor.  If you were prescribed an antibiotic medicine, take it as told by your doctor. Do not stop taking the antibiotic even if you start to feel better.  Lifestyle       Rest as told by your doctor.  Do not use any products that contain nicotine or tobacco, such as cigarettes and e-cigarettes. If you need help quitting, ask your doctor.  Do not drink alcohol.  Make lifestyle changes as told by your doctor. These may include:  Getting regular exercise. Ask your doctor what activities are safe for you.  Eating a heart-healthy diet. A diet and nutrition specialist (dietitian) can help you to learn healthy eating options.  Staying at a healthy weight.  Managing diabetes, if needed.  Lowering your stress. Activities such as yoga and relaxation techniques can help.  General instructions     Avoid any activities that cause you to have chest pain.  Keep all follow-up visits as told by your doctor. This is important. You may need more testing if your chest pain does not go away.  Contact a doctor if:  Your chest pain does not go away.  You feel depressed.  You have a fever.  Get help right away if:  Your chest pain is worse.  You have a cough that gets worse, or you cough up blood.  You have very bad (severe) pain in your belly (abdomen).  You pass out (faint).  You have either of these for no clear reason:  Sudden chest discomfort.  Sudden discomfort in your arms, back, neck, or jaw.  You have shortness of breath at any time.  You suddenly start to sweat, or your skin gets clammy.  You feel sick to your stomach (nauseous).  You throw up (vomit).  You suddenly feel lightheaded or dizzy.  You feel very weak or tired.  Your heart starts to beat fast, or it feels like it is skipping beats.  These symptoms may be an emergency. Do not wait to see if the symptoms will go away. Get medical help right away. Call your local emergency services (911 in the [place] Do not drive yourself to the hospital.     Summary  Chest pain can be caused by many different conditions. The cause may be serious and need treatment right away. If you have chest pain, see your doctor right away.  Follow your doctor's instructions for taking medicines and making lifestyle changes. Keep all follow-up visits as told by your doctor. This includes visits for any further testing if your chest pain does not go away.  Know what signs mean you should get medical help right away.  This information is not intended to replace advice given to you by your health care provider. Make sure you discuss any questions you have with your health care provider.

## 2019-09-07 NOTE — ED PROVIDER NOTE - PMH
Cervical radiculopathy  C6-7 ACDF 15 YEARS AGO  UTI (urinary tract infection), bacterial  2017, treated

## 2019-09-07 NOTE — ED PROVIDER NOTE - PHYSICAL EXAMINATION
GEN: calm, cooperative  ENT: mucous membranes moist  HEAD: NCAT  CV: S1 S2   RESP: no respiratory distress  ABD: no abdominal distension  MSK: moves all extremities  NEURO: awake, alert, oriented  PSYCH: affect normal

## 2019-09-07 NOTE — ED PROVIDER NOTE - PATIENT PORTAL LINK FT
You can access the FollowMyHealth Patient Portal offered by [facility] by registering at the following website: [email or web address]. By joining [facility]’s FollowMyHealth portal, you will also be able to view your health information using other applications (apps) compatible with our system.

## 2019-09-07 NOTE — ED PROVIDER NOTE - OBJECTIVE STATEMENT
49 yo female hx chronic neck pain s/p ACDF ~2 months ago p/w 2h substernal chest pressure, nonradiating, moderate, constant, associated with perioral paresthesias.  No fevers, cough, SOB, recent illness.  No LOC, no N/V.  Never had this pain before today.

## 2019-09-07 NOTE — ED PROVIDER NOTE - CLINICAL SUMMARY MEDICAL DECISION MAKING FREE TEXT BOX
substernal CP more likely GI, MSK or anxiety related; EKG unremarkable; will give ASA and send troponins to be conservative, CXR, GI cocktail and reassess.  not PE, not dissection.

## 2019-10-02 ENCOUNTER — OTHER (OUTPATIENT)
Age: 49
End: 2019-10-02

## 2019-10-07 ENCOUNTER — OTHER (OUTPATIENT)
Age: 49
End: 2019-10-07

## 2019-10-10 ENCOUNTER — TRANSCRIPTION ENCOUNTER (OUTPATIENT)
Age: 49
End: 2019-10-10

## 2019-10-12 ENCOUNTER — OUTPATIENT (OUTPATIENT)
Dept: OUTPATIENT SERVICES | Facility: HOSPITAL | Age: 49
LOS: 1 days | End: 2019-10-12
Payer: COMMERCIAL

## 2019-10-12 ENCOUNTER — APPOINTMENT (OUTPATIENT)
Dept: RADIOLOGY | Facility: CLINIC | Age: 49
End: 2019-10-12
Payer: COMMERCIAL

## 2019-10-12 DIAGNOSIS — M54.12 RADICULOPATHY, CERVICAL REGION: Chronic | ICD-10-CM

## 2019-10-12 DIAGNOSIS — M50.20 OTHER CERVICAL DISC DISPLACEMENT, UNSPECIFIED CERVICAL REGION: ICD-10-CM

## 2019-10-12 DIAGNOSIS — Z98.890 OTHER SPECIFIED POSTPROCEDURAL STATES: Chronic | ICD-10-CM

## 2019-10-12 DIAGNOSIS — Z98.891 HISTORY OF UTERINE SCAR FROM PREVIOUS SURGERY: Chronic | ICD-10-CM

## 2019-10-12 DIAGNOSIS — S02.2XXA FRACTURE OF NASAL BONES, INITIAL ENCOUNTER FOR CLOSED FRACTURE: Chronic | ICD-10-CM

## 2019-10-12 DIAGNOSIS — Z98.1 ARTHRODESIS STATUS: ICD-10-CM

## 2019-10-12 PROCEDURE — 72040 X-RAY EXAM NECK SPINE 2-3 VW: CPT | Mod: 26

## 2019-10-12 PROCEDURE — 72040 X-RAY EXAM NECK SPINE 2-3 VW: CPT

## 2019-10-16 ENCOUNTER — APPOINTMENT (OUTPATIENT)
Dept: SPINE | Facility: CLINIC | Age: 49
End: 2019-10-16
Payer: COMMERCIAL

## 2019-10-16 VITALS
HEART RATE: 76 BPM | HEIGHT: 67 IN | WEIGHT: 150 LBS | BODY MASS INDEX: 23.54 KG/M2 | SYSTOLIC BLOOD PRESSURE: 106 MMHG | DIASTOLIC BLOOD PRESSURE: 73 MMHG

## 2019-10-16 PROCEDURE — 99214 OFFICE O/P EST MOD 30 MIN: CPT

## 2019-10-16 RX ORDER — MULTIVITAMIN
TABLET ORAL
Refills: 0 | Status: ACTIVE | COMMUNITY

## 2019-11-11 ENCOUNTER — RX RENEWAL (OUTPATIENT)
Age: 49
End: 2019-11-11

## 2019-12-20 ENCOUNTER — OTHER (OUTPATIENT)
Age: 49
End: 2019-12-20

## 2020-01-06 ENCOUNTER — OTHER (OUTPATIENT)
Age: 50
End: 2020-01-06

## 2020-01-11 ENCOUNTER — APPOINTMENT (OUTPATIENT)
Dept: CT IMAGING | Facility: CLINIC | Age: 50
End: 2020-01-11
Payer: COMMERCIAL

## 2020-01-11 ENCOUNTER — OUTPATIENT (OUTPATIENT)
Dept: OUTPATIENT SERVICES | Facility: HOSPITAL | Age: 50
LOS: 1 days | End: 2020-01-11
Payer: COMMERCIAL

## 2020-01-11 DIAGNOSIS — M50.20 OTHER CERVICAL DISC DISPLACEMENT, UNSPECIFIED CERVICAL REGION: ICD-10-CM

## 2020-01-11 DIAGNOSIS — S02.2XXA FRACTURE OF NASAL BONES, INITIAL ENCOUNTER FOR CLOSED FRACTURE: Chronic | ICD-10-CM

## 2020-01-11 DIAGNOSIS — Z98.890 OTHER SPECIFIED POSTPROCEDURAL STATES: Chronic | ICD-10-CM

## 2020-01-11 DIAGNOSIS — M54.12 RADICULOPATHY, CERVICAL REGION: Chronic | ICD-10-CM

## 2020-01-11 DIAGNOSIS — Z98.891 HISTORY OF UTERINE SCAR FROM PREVIOUS SURGERY: Chronic | ICD-10-CM

## 2020-01-11 PROCEDURE — 72125 CT NECK SPINE W/O DYE: CPT

## 2020-01-11 PROCEDURE — 76376 3D RENDER W/INTRP POSTPROCES: CPT

## 2020-01-11 PROCEDURE — 72125 CT NECK SPINE W/O DYE: CPT | Mod: 26

## 2020-01-11 PROCEDURE — 76376 3D RENDER W/INTRP POSTPROCES: CPT | Mod: 26

## 2020-01-15 ENCOUNTER — APPOINTMENT (OUTPATIENT)
Dept: SPINE | Facility: CLINIC | Age: 50
End: 2020-01-15
Payer: COMMERCIAL

## 2020-01-15 PROCEDURE — 99213 OFFICE O/P EST LOW 20 MIN: CPT

## 2020-08-11 ENCOUNTER — RESULT REVIEW (OUTPATIENT)
Age: 50
End: 2020-08-11

## 2020-08-14 ENCOUNTER — OUTPATIENT (OUTPATIENT)
Dept: OUTPATIENT SERVICES | Facility: HOSPITAL | Age: 50
LOS: 1 days | End: 2020-08-14
Payer: COMMERCIAL

## 2020-08-14 ENCOUNTER — APPOINTMENT (OUTPATIENT)
Dept: RADIOLOGY | Facility: CLINIC | Age: 50
End: 2020-08-14
Payer: COMMERCIAL

## 2020-08-14 ENCOUNTER — RESULT REVIEW (OUTPATIENT)
Age: 50
End: 2020-08-14

## 2020-08-14 DIAGNOSIS — Z98.891 HISTORY OF UTERINE SCAR FROM PREVIOUS SURGERY: Chronic | ICD-10-CM

## 2020-08-14 DIAGNOSIS — S02.2XXA FRACTURE OF NASAL BONES, INITIAL ENCOUNTER FOR CLOSED FRACTURE: Chronic | ICD-10-CM

## 2020-08-14 DIAGNOSIS — Z98.890 OTHER SPECIFIED POSTPROCEDURAL STATES: Chronic | ICD-10-CM

## 2020-08-14 DIAGNOSIS — M54.12 RADICULOPATHY, CERVICAL REGION: Chronic | ICD-10-CM

## 2020-08-14 DIAGNOSIS — Z00.8 ENCOUNTER FOR OTHER GENERAL EXAMINATION: ICD-10-CM

## 2020-08-14 PROCEDURE — 72040 X-RAY EXAM NECK SPINE 2-3 VW: CPT

## 2020-08-14 PROCEDURE — 72040 X-RAY EXAM NECK SPINE 2-3 VW: CPT | Mod: 26

## 2020-08-26 ENCOUNTER — APPOINTMENT (OUTPATIENT)
Dept: SPINE | Facility: CLINIC | Age: 50
End: 2020-08-26
Payer: COMMERCIAL

## 2020-08-26 DIAGNOSIS — M50.20 OTHER CERVICAL DISC DISPLACEMENT, UNSPECIFIED CERVICAL REGION: ICD-10-CM

## 2020-08-26 PROCEDURE — 99213 OFFICE O/P EST LOW 20 MIN: CPT | Mod: 95

## 2020-08-26 RX ORDER — DIAZEPAM 5 MG/1
5 TABLET ORAL EVERY 8 HOURS
Qty: 21 | Refills: 0 | Status: DISCONTINUED | COMMUNITY
Start: 2019-12-20 | End: 2020-08-26

## 2020-09-29 ENCOUNTER — APPOINTMENT (OUTPATIENT)
Dept: SPINE | Facility: CLINIC | Age: 50
End: 2020-09-29
Payer: COMMERCIAL

## 2020-09-29 VITALS
OXYGEN SATURATION: 98 % | TEMPERATURE: 98 F | HEIGHT: 67 IN | DIASTOLIC BLOOD PRESSURE: 65 MMHG | BODY MASS INDEX: 23.54 KG/M2 | WEIGHT: 150 LBS | SYSTOLIC BLOOD PRESSURE: 91 MMHG | HEART RATE: 89 BPM

## 2020-09-29 DIAGNOSIS — M41.9 SCOLIOSIS, UNSPECIFIED: ICD-10-CM

## 2020-09-29 DIAGNOSIS — Z98.1 ARTHRODESIS STATUS: ICD-10-CM

## 2020-09-29 PROCEDURE — 99213 OFFICE O/P EST LOW 20 MIN: CPT

## 2020-09-29 RX ORDER — PREGABALIN 75 MG/1
75 CAPSULE ORAL
Refills: 0 | Status: ACTIVE | COMMUNITY

## 2020-09-29 NOTE — REASON FOR VISIT
[Follow-Up: _____] : a [unfilled] follow-up visit [FreeTextEntry1] : 49-year-old woman here for evaluation of scoliosis\par S/P ACDF 2019 by Dr Gonzalez\par During her routine postoperative check she was told by Dr. Gonzalez that she has scoliosis.  She has no symptoms referable to scoliosis diagnosis.  No leg pain or weakness.  She ambulates independently.\par

## 2020-09-29 NOTE — PHYSICAL EXAM
[General Appearance - Alert] : alert [General Appearance - In No Acute Distress] : in no acute distress [Oriented To Time, Place, And Person] : oriented to person, place, and time [Impaired Insight] : insight and judgment were intact [Cranial Nerves Optic (II)] : visual acuity intact bilaterally,  pupils equal round and reactive to light [Cranial Nerves Oculomotor (III)] : extraocular motion intact [Cranial Nerves Trigeminal (V)] : facial sensation intact symmetrically [Cranial Nerves Facial (VII)] : face symmetrical [Cranial Nerves Vestibulocochlear (VIII)] : hearing was intact bilaterally [Cranial Nerves Glossopharyngeal (IX)] : tongue and palate midline [Cranial Nerves Accessory (XI - Cranial And Spinal)] : head turning and shoulder shrug symmetric [Cranial Nerves Hypoglossal (XII)] : there was no tongue deviation with protrusion [Motor Tone] : muscle tone was normal in all four extremities [Motor Strength] : muscle strength was normal in all four extremities [Balance] : balance was intact [Deformity] : deformity [Scoliosis] : scoliosis [No Visual Abnormalities] : no visible abnormailities [Sclera] : the sclera and conjunctiva were normal [] : no respiratory distress [Heart Rate And Rhythm] : heart rate was normal and rhythm regular [Abnormal Walk] : normal gait

## 2020-09-29 NOTE — ASSESSMENT
[FreeTextEntry1] : 49-year-old woman here for evaluation of scoliosis\par \par Follow-up with new scoliosis x-ray

## 2022-03-09 NOTE — H&P PST ADULT - NSALCOHOLPROBLEMSRELYN_GEN_A_CORE_SD
[facility]     Progress Note    Patient Name: Italia Olvera  : 1959  MRN: [id number]  Primary Care Physician:  Carloz Shoemaker MD  Date of admission: 2022      Subjective   Brief summary.  Patient admitted to hospital for ischemic toes and peripheral vascular disease now post revascularization surgery      HPI:  Seen earlier this morning feeling better, patient scheduled to have CT angiogram.  Left thigh swelling, nurse concerned about aneurysm and called vascular surgeon, ultrasound could not be done because of pain    Review of Systems     No fever, no shortness of breath no chest pain, weakness      Objective     Vitals:   Temp:  [98.1 °F (36.7 °C)-98.4 °F (36.9 °C)] 98.1 °F (36.7 °C)  Heart Rate:  [56-64] 56  Resp:  [18-20] 18  BP: (121-165)/(52-70) 126/62    Physical Exam :     Elderly female not in acute distress .  Heart regular and lungs clear.  Left thigh some swelling and tenderness on the medial aspect.  Extremities with trace of edema, toes unchanged      Result Review:  I have personally reviewed the results from the time of this admission to 3/9/2022 13:44 EST and agree with these findings:  [x]  Laboratory  []  Microbiology  []  Radiology  []  EKG/Telemetry   []  Cardiology/Vascular   []  Pathology  []  Old records  []  Other:           Assessment / Plan       Active Hospital Problems:  Active Hospital Problems    Diagnosis    • **Diabetes mellitus with peripheral vascular disease (HCC)    • Paroxysmal atrial fibrillation ([facility])    • Epistaxis    • Decubitus ulcer, unstageable ([facility])    • Constipation    • Anxiety disorder    • Acute osteomyelitis of toe of right foot ([facility])    • Osteomyelitis ([facility])    • Atherosclerosis of native artery of extremity ([facility])      Added automatically from request for surgery [id number]     • Peripheral neuropathy    • T2DM (type 2 diabetes mellitus) (CMS/[facility])- uncontrolled        Plan:   Patient CT angiogram reviewed, anasarca noted, IV Lasix x1, noted  vascular surgeon's report and recommendations.  Possible discharge tomorrow as patient does not want to go to nursing home or rehab prefers home with home health       DVT prophylaxis:  Medical DVT prophylaxis orders are present.    CODE STATUS:   Level Of Support Discussed With: Patient  Code Status (Patient has no pulse and is not breathing): CPR (Attempt to Resuscitate)  Medical Interventions (Patient has pulse or is breathing): Full Support            Electronically signed by Rudy Garnett MD, 03/09/22, 1:44 PM EST.            no

## 2022-08-27 ENCOUNTER — EMERGENCY (EMERGENCY)
Facility: HOSPITAL | Age: 52
LOS: 1 days | Discharge: HOME | End: 2022-08-27
Attending: EMERGENCY MEDICINE
Payer: COMMERCIAL

## 2022-08-27 VITALS
HEART RATE: 71 BPM | WEIGHT: 149.91 LBS | HEIGHT: 67 IN | RESPIRATION RATE: 16 BRPM | OXYGEN SATURATION: 98 % | DIASTOLIC BLOOD PRESSURE: 73 MMHG | TEMPERATURE: 99 F | SYSTOLIC BLOOD PRESSURE: 105 MMHG

## 2022-08-27 DIAGNOSIS — Z98.891 HISTORY OF UTERINE SCAR FROM PREVIOUS SURGERY: Chronic | ICD-10-CM

## 2022-08-27 DIAGNOSIS — S02.2XXA FRACTURE OF NASAL BONES, INITIAL ENCOUNTER FOR CLOSED FRACTURE: Chronic | ICD-10-CM

## 2022-08-27 DIAGNOSIS — M54.12 RADICULOPATHY, CERVICAL REGION: Chronic | ICD-10-CM

## 2022-08-27 DIAGNOSIS — Z98.890 OTHER SPECIFIED POSTPROCEDURAL STATES: Chronic | ICD-10-CM

## 2022-08-27 LAB
ALBUMIN SERPL ELPH-MCNC: 3.8 G/DL — SIGNIFICANT CHANGE UP (ref 3.3–5)
ALP SERPL-CCNC: 117 U/L — SIGNIFICANT CHANGE UP (ref 40–120)
ALT FLD-CCNC: 44 U/L — SIGNIFICANT CHANGE UP (ref 10–45)
ANION GAP SERPL CALC-SCNC: 10 MMOL/L — SIGNIFICANT CHANGE UP (ref 5–17)
APPEARANCE UR: ABNORMAL
APTT BLD: 30.1 SEC — SIGNIFICANT CHANGE UP (ref 27.5–35.5)
AST SERPL-CCNC: 21 U/L — SIGNIFICANT CHANGE UP (ref 10–40)
BACTERIA # UR AUTO: NEGATIVE — SIGNIFICANT CHANGE UP
BASE EXCESS BLDV CALC-SCNC: 5 MMOL/L — HIGH (ref -2–3)
BASOPHILS # BLD AUTO: 0.07 K/UL — SIGNIFICANT CHANGE UP (ref 0–0.2)
BASOPHILS NFR BLD AUTO: 0.7 % — SIGNIFICANT CHANGE UP (ref 0–2)
BILIRUB SERPL-MCNC: 0.4 MG/DL — SIGNIFICANT CHANGE UP (ref 0.2–1.2)
BILIRUB UR-MCNC: NEGATIVE — SIGNIFICANT CHANGE UP
BUN SERPL-MCNC: 7 MG/DL — SIGNIFICANT CHANGE UP (ref 7–23)
CA-I SERPL-SCNC: 1.19 MMOL/L — SIGNIFICANT CHANGE UP (ref 1.15–1.33)
CALCIUM SERPL-MCNC: 9.5 MG/DL — SIGNIFICANT CHANGE UP (ref 8.4–10.5)
CHLORIDE BLDV-SCNC: 102 MMOL/L — SIGNIFICANT CHANGE UP (ref 96–108)
CHLORIDE SERPL-SCNC: 103 MMOL/L — SIGNIFICANT CHANGE UP (ref 96–108)
CO2 BLDV-SCNC: 32 MMOL/L — HIGH (ref 22–26)
CO2 SERPL-SCNC: 28 MMOL/L — SIGNIFICANT CHANGE UP (ref 22–31)
COLOR SPEC: COLORLESS — SIGNIFICANT CHANGE UP
CREAT SERPL-MCNC: 0.74 MG/DL — SIGNIFICANT CHANGE UP (ref 0.5–1.3)
DIFF PNL FLD: ABNORMAL
EGFR: 98 ML/MIN/1.73M2 — SIGNIFICANT CHANGE UP
EOSINOPHIL # BLD AUTO: 0.12 K/UL — SIGNIFICANT CHANGE UP (ref 0–0.5)
EOSINOPHIL NFR BLD AUTO: 1.2 % — SIGNIFICANT CHANGE UP (ref 0–6)
EPI CELLS # UR: 17 /HPF — HIGH
GAS PNL BLDV: 135 MMOL/L — LOW (ref 136–145)
GAS PNL BLDV: SIGNIFICANT CHANGE UP
GAS PNL BLDV: SIGNIFICANT CHANGE UP
GLUCOSE BLDV-MCNC: 92 MG/DL — SIGNIFICANT CHANGE UP (ref 70–99)
GLUCOSE SERPL-MCNC: 110 MG/DL — HIGH (ref 70–99)
GLUCOSE UR QL: NEGATIVE — SIGNIFICANT CHANGE UP
HCO3 BLDV-SCNC: 31 MMOL/L — HIGH (ref 22–29)
HCT VFR BLD CALC: 35.4 % — SIGNIFICANT CHANGE UP (ref 34.5–45)
HCT VFR BLDA CALC: 32 % — LOW (ref 34.5–46.5)
HGB BLD CALC-MCNC: 10.7 G/DL — LOW (ref 11.7–16.1)
HGB BLD-MCNC: 11.8 G/DL — SIGNIFICANT CHANGE UP (ref 11.5–15.5)
HYALINE CASTS # UR AUTO: 1 /LPF — SIGNIFICANT CHANGE UP (ref 0–2)
IMM GRANULOCYTES NFR BLD AUTO: 0.5 % — SIGNIFICANT CHANGE UP (ref 0–1.5)
INR BLD: 1.16 RATIO — SIGNIFICANT CHANGE UP (ref 0.88–1.16)
KETONES UR-MCNC: NEGATIVE — SIGNIFICANT CHANGE UP
LACTATE BLDV-MCNC: 0.8 MMOL/L — SIGNIFICANT CHANGE UP (ref 0.5–2)
LEUKOCYTE ESTERASE UR-ACNC: NEGATIVE — SIGNIFICANT CHANGE UP
LYMPHOCYTES # BLD AUTO: 1.75 K/UL — SIGNIFICANT CHANGE UP (ref 1–3.3)
LYMPHOCYTES # BLD AUTO: 17.9 % — SIGNIFICANT CHANGE UP (ref 13–44)
MCHC RBC-ENTMCNC: 31.5 PG — SIGNIFICANT CHANGE UP (ref 27–34)
MCHC RBC-ENTMCNC: 33.3 GM/DL — SIGNIFICANT CHANGE UP (ref 32–36)
MCV RBC AUTO: 94.4 FL — SIGNIFICANT CHANGE UP (ref 80–100)
MONOCYTES # BLD AUTO: 0.56 K/UL — SIGNIFICANT CHANGE UP (ref 0–0.9)
MONOCYTES NFR BLD AUTO: 5.7 % — SIGNIFICANT CHANGE UP (ref 2–14)
NEUTROPHILS # BLD AUTO: 7.21 K/UL — SIGNIFICANT CHANGE UP (ref 1.8–7.4)
NEUTROPHILS NFR BLD AUTO: 74 % — SIGNIFICANT CHANGE UP (ref 43–77)
NITRITE UR-MCNC: NEGATIVE — SIGNIFICANT CHANGE UP
NRBC # BLD: 0 /100 WBCS — SIGNIFICANT CHANGE UP (ref 0–0)
PCO2 BLDV: 51 MMHG — HIGH (ref 39–42)
PH BLDV: 7.39 — SIGNIFICANT CHANGE UP (ref 7.32–7.43)
PH UR: 6.5 — SIGNIFICANT CHANGE UP (ref 5–8)
PLATELET # BLD AUTO: 296 K/UL — SIGNIFICANT CHANGE UP (ref 150–400)
PO2 BLDV: 28 MMHG — SIGNIFICANT CHANGE UP (ref 25–45)
POTASSIUM BLDV-SCNC: 4.2 MMOL/L — SIGNIFICANT CHANGE UP (ref 3.5–5.1)
POTASSIUM SERPL-MCNC: 4.2 MMOL/L — SIGNIFICANT CHANGE UP (ref 3.5–5.3)
POTASSIUM SERPL-SCNC: 4.2 MMOL/L — SIGNIFICANT CHANGE UP (ref 3.5–5.3)
PROT SERPL-MCNC: 7.3 G/DL — SIGNIFICANT CHANGE UP (ref 6–8.3)
PROT UR-MCNC: NEGATIVE — SIGNIFICANT CHANGE UP
PROTHROM AB SERPL-ACNC: 13.5 SEC — HIGH (ref 10.5–13.4)
RAPID RVP RESULT: SIGNIFICANT CHANGE UP
RBC # BLD: 3.75 M/UL — LOW (ref 3.8–5.2)
RBC # FLD: 11.8 % — SIGNIFICANT CHANGE UP (ref 10.3–14.5)
RBC CASTS # UR COMP ASSIST: 6 /HPF — HIGH (ref 0–4)
SAO2 % BLDV: 38.3 % — LOW (ref 67–88)
SARS-COV-2 RNA SPEC QL NAA+PROBE: SIGNIFICANT CHANGE UP
SODIUM SERPL-SCNC: 141 MMOL/L — SIGNIFICANT CHANGE UP (ref 135–145)
SP GR SPEC: 1.01 — LOW (ref 1.01–1.02)
UROBILINOGEN FLD QL: NEGATIVE — SIGNIFICANT CHANGE UP
WBC # BLD: 9.76 K/UL — SIGNIFICANT CHANGE UP (ref 3.8–10.5)
WBC # FLD AUTO: 9.76 K/UL — SIGNIFICANT CHANGE UP (ref 3.8–10.5)
WBC UR QL: 3 /HPF — SIGNIFICANT CHANGE UP (ref 0–5)

## 2022-08-27 PROCEDURE — 74177 CT ABD & PELVIS W/CONTRAST: CPT | Mod: 26,MA

## 2022-08-27 PROCEDURE — 99218: CPT

## 2022-08-27 PROCEDURE — 93010 ELECTROCARDIOGRAM REPORT: CPT

## 2022-08-27 RX ORDER — ONDANSETRON 8 MG/1
4 TABLET, FILM COATED ORAL ONCE
Refills: 0 | Status: COMPLETED | OUTPATIENT
Start: 2022-08-27 | End: 2022-08-27

## 2022-08-27 RX ORDER — SODIUM CHLORIDE 9 MG/ML
1000 INJECTION, SOLUTION INTRAVENOUS
Refills: 0 | Status: DISCONTINUED | OUTPATIENT
Start: 2022-08-27 | End: 2022-08-30

## 2022-08-27 RX ORDER — CEFTRIAXONE 500 MG/1
1000 INJECTION, POWDER, FOR SOLUTION INTRAMUSCULAR; INTRAVENOUS ONCE
Refills: 0 | Status: COMPLETED | OUTPATIENT
Start: 2022-08-27 | End: 2022-08-27

## 2022-08-27 RX ORDER — CEFTRIAXONE 500 MG/1
1000 INJECTION, POWDER, FOR SOLUTION INTRAMUSCULAR; INTRAVENOUS EVERY 24 HOURS
Refills: 0 | Status: DISCONTINUED | OUTPATIENT
Start: 2022-08-27 | End: 2022-08-30

## 2022-08-27 RX ORDER — ACETAMINOPHEN 500 MG
650 TABLET ORAL ONCE
Refills: 0 | Status: COMPLETED | OUTPATIENT
Start: 2022-08-27 | End: 2022-08-27

## 2022-08-27 RX ORDER — KETOROLAC TROMETHAMINE 30 MG/ML
15 SYRINGE (ML) INJECTION ONCE
Refills: 0 | Status: DISCONTINUED | OUTPATIENT
Start: 2022-08-27 | End: 2022-08-27

## 2022-08-27 RX ORDER — SODIUM CHLORIDE 9 MG/ML
1000 INJECTION, SOLUTION INTRAVENOUS ONCE
Refills: 0 | Status: COMPLETED | OUTPATIENT
Start: 2022-08-27 | End: 2022-08-27

## 2022-08-27 RX ADMIN — SODIUM CHLORIDE 125 MILLILITER(S): 9 INJECTION, SOLUTION INTRAVENOUS at 17:26

## 2022-08-27 RX ADMIN — Medication 650 MILLIGRAM(S): at 11:25

## 2022-08-27 RX ADMIN — SODIUM CHLORIDE 1000 MILLILITER(S): 9 INJECTION, SOLUTION INTRAVENOUS at 11:26

## 2022-08-27 RX ADMIN — Medication 650 MILLIGRAM(S): at 11:54

## 2022-08-27 RX ADMIN — SODIUM CHLORIDE 1000 MILLILITER(S): 9 INJECTION, SOLUTION INTRAVENOUS at 15:56

## 2022-08-27 RX ADMIN — CEFTRIAXONE 100 MILLIGRAM(S): 500 INJECTION, POWDER, FOR SOLUTION INTRAMUSCULAR; INTRAVENOUS at 15:51

## 2022-08-27 RX ADMIN — Medication 650 MILLIGRAM(S): at 19:27

## 2022-08-27 RX ADMIN — ONDANSETRON 4 MILLIGRAM(S): 8 TABLET, FILM COATED ORAL at 11:49

## 2022-08-27 RX ADMIN — Medication 15 MILLIGRAM(S): at 22:10

## 2022-08-27 RX ADMIN — Medication 650 MILLIGRAM(S): at 22:08

## 2022-08-27 NOTE — ED PROVIDER NOTE - NS ED ROS FT
Constitutional: +fever, chills.  Eyes:  No visual changes  ENMT:  No neck pain  Cardiac:  No chest pain  Respiratory:  No cough, SOB  GI:  +nausea, +abdominal pain.  :  +dysuria,   MS:  +back pain.  Neuro:  No headache or lightheadedness  Skin:  No skin rash  Except as documented in the HPI,  all other systems are negative.

## 2022-08-27 NOTE — ED ADULT NURSE REASSESSMENT NOTE - NS ED NURSE REASSESS COMMENT FT1
Received awake alert and orientedx4 Denies any discomfort Resp even and nonlab Afebrile Oriented to CDU Made comfortable.
Received pt from [facility] , received pt alert and responsive, oriented x4, denies any respiratory distress, SOB, or difficulty breathing. Pt transferred to CDU for urinary complaints. Pt denies abdominal/ flank pain at this time. Endorses headache, medicated per order. Received on ordered IV fluids, tolerating well.  IV in place, patent and free of signs of infiltration. Pending IV rocephin q24, pt aware next dose is at 3pm 8/28. V/S stable, pt afebrile. Pt educated on unit and unit rules, instructed patient to notify RN of any needed assistance, Pt verbalizes understanding, Call bell placed within reach. Safety maintained. Will continue to monitor.

## 2022-08-27 NOTE — ED PROVIDER NOTE - PHYSICAL EXAMINATION
Vital signs reviewed  GENERAL: Patient nontoxic appearing, NAD  HEAD: NCAT  EYES: Anicteric  ENT: MMM  NECK: Supple, non tender  RESPIRATORY: Normal respiratory effort. CTA B/L. No wheezing, rales, rhonchi  CARDIOVASCULAR: Regular rate and rhythm  ABDOMEN: Soft. generalized abd ttp. no guarding or rebound. +R CVA ttp.   MUSCULOSKELETAL/EXTREMITIES: Brisk cap refill. 2+ radial pulses. No leg edema.  SKIN:  Warm and dry  NEURO: AAOx3. No gross FND.  PSYCHIATRIC: Cooperative. Affect appropriate.

## 2022-08-27 NOTE — ED CDU PROVIDER INITIAL DAY NOTE - OBJECTIVE STATEMENT
51y F p/w fever x 6 days. tmax 103, +urinary frequency and dysuria. Went to UC 4 days ago, was given cipro however sxs have not improved. +generalized abd pain, mostly lower abd, 5/10, dull. as well as R sided low back pain. +nausea. Pt been on Cipro 500mg BID since Tuesday. No dose today.   pt denies any cp, palpitations, sob, v/d, headache, rash   also endorse an episode of vaginal bleeding 2 weeks ago, lasted for a few days and subsided. currently not endorsing any bleeding or discharge.

## 2022-08-27 NOTE — ED ADULT NURSE REASSESSMENT NOTE - COMFORT CARE
ambulated to bathroom/darkened lights/plan of care explained/po fluids offered/repositioned/warm blanket provided Mart-1 - Negative Histology Text: MART-1 staining demonstrates a normal density and pattern of melanocytes along the dermal-epidermal junction. The surgical margins are negative for tumor cells.

## 2022-08-27 NOTE — ED PROVIDER NOTE - ATTENDING CONTRIBUTION TO CARE
Attending MD Mullen: I personally have seen and examined this patient.  Resident note reviewed and agree on plan of care and except where noted.  See below for details.     seen in [facility], accompanied    51F with PMH/PSH including s/p cervical sx, s/p C section (PATIENT DENIES HISTORY OF LIVER TRANSPLANT NOTED IN PMHX) presents to the ED with fevers, RLQ pain, malodorous urine and dysuria.  Reports had two weeks of vaginal bleeding 7/22/22, reports when that resolved noted malodorous urine.  Reports took Pyridium, reports had UTIs in past.  Reports about a week after symptoms started had two days of vaginal bleeding which then resolved spontaneously.  Reports was told by Gyn that this type of bleeding could be related to being perimenopausal.  Reports Tuesday 8/23/22 went to Urgent Care because urinary symptoms were not improving.  Was Rx'ed Cipro 500mg BID and has been compliant. Review of UrCx on her phone reveals >100,00 EColi, sensitive to Cipro.  Reports presents today because has been having fever for 6 days.  Reports felt lightheaded, near syncopal with walking which is not her baseline.  Reports R flank pain and RLQ pain.  Reports pain with urination, denies hematuria.  Reports has been drinking a lot of water.  Reports nausea, denies vomiting, diarrhea, bloody or black stools.  Reports generalized body aches.    Exam:   General: NAD  HENT: head NCAT, airway patent   Eyes: anicteric, no conjunctival injection   Lungs: lungs CTAB with good inspiratory effort, no wheezing, no rhonchi, no rales  Cardiac: +S1S2, no obvious m/r/g  GI: abdomen soft with +BS, +tenderness to RLQ, +McBurneys, neg Rovsings, no rebound, no guarding, ND  : R CVAT  MSK: ranging neck and extremities freely   Neuro: moving all extremities spontaneously, sensory grossly intact, no gross neuro deficits  Psych: normal mood and affect     A/P: 51F with RLQ pain, dysuria, UTI, DDx includes pyelo vs appy, will obtain labs, CTAP, bld cx, UA/UrCx, will give Tylenol, Zofran, Zofran, reassess

## 2022-08-27 NOTE — ED PROVIDER NOTE - OBJECTIVE STATEMENT
51y F p/w fever x 6 days. tmax 103, +urinary frequency and dysuria. Went to UC 4 days ago, was given cipro however sxs have not improved. +generalized abd pain, mostly lower abd, 5/10, dull. as well as R sided low back pain. +nausea.   pt denies any cp, palpitations, sob, v/d, headache, rash   also endorse an episode of vaginal bleeding 2 weeks ago, lasted for a few days and subsided. currently not endorsing any bleeding or discharge.

## 2022-08-27 NOTE — ED PROVIDER NOTE - CLINICAL SUMMARY MEDICAL DECISION MAKING FREE TEXT BOX
51y F p/w fever x 6 days. tmax 103, +urinary frequency and dysuria. vitals stable, on exam + R cva, generalized abd ttp. non toxic appearing. currently does not meet SIRS criteria however sxs and hx concerning for worsening uti/pyelo. will eval with labs, and imaging. likely admit for iv abx, bcx and reassessments.

## 2022-08-27 NOTE — ED ADULT NURSE NOTE - NSIMPLEMENTINTERV_GEN_ALL_ED
Implemented All Universal Safety Interventions:  [place] to call system. Call bell, personal items and telephone within reach. Instruct patient to call for assistance. Room bathroom lighting operational. Non-slip footwear when patient is off stretcher. Physically safe environment: no spills, clutter or unnecessary equipment. Stretcher in lowest position, wheels locked, appropriate side rails in place.

## 2022-08-27 NOTE — ED PROVIDER NOTE - PROGRESS NOTE DETAILS
Cassidy Morales PGY-3 patient reassessed, ct non actionable. given that patient has continued to have fever with urinary symptom despite being on abx will watch in obs. patient amenable to staying in ed Attending MD Mullen: Lab and radiology tests reviewed with patient and .  Amenable to CDU for IV abx, frequent reassessments.

## 2022-08-27 NOTE — ED ADULT NURSE NOTE - OBJECTIVE STATEMENT
pt is here for fever.  pt stated that heavy bleeding x 2 weeks, fever comes and goes, taking cipro x 4 days,  tenderness to LLQ, c/o feeling dizziness and "fainting" pt is here for fever.  pt stated that heavy bleeding x 2 weeks, fever comes and goes, taking cipro x 4 days,  tenderness to LLQ, c/o feeling dizziness and "fainting", c/o nausea pt is here for fever.  pt stated that heavy bleeding x 2 weeks, fever comes and goes, taking cipro x 4 days,  tenderness to RLQ, c/o feeling dizziness and "fainting", c/o nausea

## 2022-08-27 NOTE — ED CDU PROVIDER INITIAL DAY NOTE - NS ED ATTENDING STATEMENT MOD
This was a shared visit with the EAN. I reviewed and verified the documentation and independently performed the documented:

## 2022-08-27 NOTE — ED CDU PROVIDER INITIAL DAY NOTE - ATTENDING APP SHARED VISIT CONTRIBUTION OF CARE
Attending MD Mullen: I personally have seen and examined this patient.  Resident note reviewed and agree on plan of care and except where noted.  See below for details.     seen in [place] [facility], accompanied    51F with PMH/PSH including s/p cervical sx, s/p C section (PATIENT DENIES HISTORY OF LIVER TRANSPLANT NOTED IN PMHX) presents to the ED with fevers, RLQ pain, malodorous urine and dysuria.  Reports had two weeks of vaginal bleeding 7/22/22, reports when that resolved noted malodorous urine.  Reports took Pyridium, reports had UTIs in past.  Reports about a week after symptoms started had two days of vaginal bleeding which then resolved spontaneously.  Reports was told by Gyn that this type of bleeding could be related to being perimenopausal.  Reports Tuesday 8/23/22 went to Urgent Care because urinary symptoms were not improving.  Was Rx'ed Cipro 500mg BID and has been compliant. Review of UrCx on her phone reveals >100,00 EColi, sensitive to Cipro.  Reports presents today because has been having fever for 6 days.  Reports felt lightheaded, near syncopal with walking which is not her baseline.  Reports R flank pain and RLQ pain.  Reports pain with urination, denies hematuria.  Reports has been drinking a lot of water.  Reports nausea, denies vomiting, diarrhea, bloody or black stools.  Reports generalized body aches.    Exam:   General: NAD  HENT: head NCAT, airway patent   Eyes: anicteric, no conjunctival injection   Lungs: lungs CTAB with good inspiratory effort, no wheezing, no rhonchi, no rales  Cardiac: +S1S2, no obvious m/r/g  GI: abdomen soft with +BS, +tenderness to RLQ, +McBurneys, neg Rovsings, no rebound, no guarding, ND  : R CVAT  MSK: ranging neck and extremities freely   Neuro: moving all extremities spontaneously, sensory grossly intact, no gross neuro deficits  Psych: normal mood and affect     A/P: 51F with RLQ pain, dysuria, fevers, CT nonactionable, concern for partially tx'ed pyelo, will obs in CDU for IV abx, frequent re-assessment, pending Bld Cx

## 2022-08-28 VITALS
TEMPERATURE: 98 F | DIASTOLIC BLOOD PRESSURE: 72 MMHG | HEART RATE: 61 BPM | SYSTOLIC BLOOD PRESSURE: 102 MMHG | OXYGEN SATURATION: 98 % | RESPIRATION RATE: 17 BRPM

## 2022-08-28 LAB
CULTURE RESULTS: SIGNIFICANT CHANGE UP
SPECIMEN SOURCE: SIGNIFICANT CHANGE UP

## 2022-08-28 PROCEDURE — 85025 COMPLETE CBC W/AUTO DIFF WBC: CPT

## 2022-08-28 PROCEDURE — G0378: CPT

## 2022-08-28 PROCEDURE — 82803 BLOOD GASES ANY COMBINATION: CPT

## 2022-08-28 PROCEDURE — 87040 BLOOD CULTURE FOR BACTERIA: CPT

## 2022-08-28 PROCEDURE — 36415 COLL VENOUS BLD VENIPUNCTURE: CPT

## 2022-08-28 PROCEDURE — 84295 ASSAY OF SERUM SODIUM: CPT

## 2022-08-28 PROCEDURE — 0225U NFCT DS DNA&RNA 21 SARSCOV2: CPT

## 2022-08-28 PROCEDURE — 85730 THROMBOPLASTIN TIME PARTIAL: CPT

## 2022-08-28 PROCEDURE — 99217: CPT

## 2022-08-28 PROCEDURE — 96376 TX/PRO/DX INJ SAME DRUG ADON: CPT

## 2022-08-28 PROCEDURE — 85610 PROTHROMBIN TIME: CPT

## 2022-08-28 PROCEDURE — 96374 THER/PROPH/DIAG INJ IV PUSH: CPT | Mod: XU

## 2022-08-28 PROCEDURE — 96361 HYDRATE IV INFUSION ADD-ON: CPT

## 2022-08-28 PROCEDURE — 74177 CT ABD & PELVIS W/CONTRAST: CPT | Mod: MA

## 2022-08-28 PROCEDURE — 82330 ASSAY OF CALCIUM: CPT

## 2022-08-28 PROCEDURE — 84132 ASSAY OF SERUM POTASSIUM: CPT

## 2022-08-28 PROCEDURE — 87086 URINE CULTURE/COLONY COUNT: CPT

## 2022-08-28 PROCEDURE — 96375 TX/PRO/DX INJ NEW DRUG ADDON: CPT

## 2022-08-28 PROCEDURE — 83690 ASSAY OF LIPASE: CPT

## 2022-08-28 PROCEDURE — 81001 URINALYSIS AUTO W/SCOPE: CPT

## 2022-08-28 PROCEDURE — 85014 HEMATOCRIT: CPT

## 2022-08-28 PROCEDURE — 80053 COMPREHEN METABOLIC PANEL: CPT

## 2022-08-28 PROCEDURE — 82947 ASSAY GLUCOSE BLOOD QUANT: CPT

## 2022-08-28 PROCEDURE — 82435 ASSAY OF BLOOD CHLORIDE: CPT

## 2022-08-28 PROCEDURE — 85018 HEMOGLOBIN: CPT

## 2022-08-28 PROCEDURE — 83605 ASSAY OF LACTIC ACID: CPT

## 2022-08-28 PROCEDURE — 99285 EMERGENCY DEPT VISIT HI MDM: CPT | Mod: 25

## 2022-08-28 RX ORDER — CEFPODOXIME PROXETIL 100 MG
1 TABLET ORAL
Qty: 28 | Refills: 0
Start: 2022-08-28 | End: 2022-09-10

## 2022-08-28 RX ADMIN — SODIUM CHLORIDE 1000 MILLILITER(S): 9 INJECTION, SOLUTION INTRAVENOUS at 04:30

## 2022-08-28 RX ADMIN — SODIUM CHLORIDE 125 MILLILITER(S): 9 INJECTION, SOLUTION INTRAVENOUS at 04:30

## 2022-08-28 RX ADMIN — Medication 15 MILLIGRAM(S): at 00:00

## 2022-08-28 RX ADMIN — CEFTRIAXONE 100 MILLIGRAM(S): 500 INJECTION, POWDER, FOR SOLUTION INTRAMUSCULAR; INTRAVENOUS at 14:56

## 2022-08-28 NOTE — ED CDU PROVIDER DISPOSITION NOTE - NSFOLLOWUPINSTRUCTIONS_ED_ALL_ED_FT
Please follow up with your primary care doctor within 1 week.  *Bring all printed lab/test results to your appointment(s).*    Take acetaminophen 500-1000mg every 6 hrs as needed for pain. DO NOT EXCEED 4000mg DAILY.  Take cefpodoxime 200mg twice daily for 7 days as prescribed.  (Please read all medication information/instructions).    Stay well hydrated with water and electrolyte replacement solutions such as Pedialyte or the adult equivalent.    Return to the ED for worsening pain, nausea, vomiting, fever, chills, weakness, or any other concerns. -Please follow up with your primary care doctor within 1 week  *Bring all printed lab/test results to your appointment(s)*    -Take antibiotics cefpodoxime 200mg twice daily for 14 days as prescribed  (Please read all medication information/instructions)    -Stay well hydrated with water and electrolyte replacement solutions such as Pedialyte or the adult equivalent    -Return to the ED for worsening pain, nausea, vomiting, fever, chills, weakness, or any other concerns

## 2022-08-28 NOTE — ED CDU PROVIDER DISPOSITION NOTE - PATIENT PORTAL LINK FT
You can access the FollowMyHealth Patient Portal offered by [facility] by registering at the following website: [email or web address]. By joining [facility]’s FollowMyHealth portal, you will also be able to view your health information using other applications (apps) compatible with our system.

## 2022-08-28 NOTE — ED CDU PROVIDER SUBSEQUENT DAY NOTE - ATTENDING APP SHARED VISIT CONTRIBUTION OF CARE
Attending MD Mullen:   I personally have seen and examined this patient.  Physician assistant note reviewed and agree on plan of care and except where noted.  See below for details.      Seen in CDU Jairon Vee 45    51F with PMH/PSH including s/p cervical sx, s/p C section (PATIENT DENIES HISTORY OF LIVER TRANSPLANT NOTED IN PMHX) sent to the CDU after presenting to the ED with fevers, RLQ pain, malodorous urine and dysuria.  Reports pain with urination has resolved.  Denies dizziness or lightheadedness with ambulation.  Reports improved abdominal pain including in RLQ.  Denies chest pain, shortness of breath, abdominal pain, nausea, vomiting, diarrhea, urinary complaints.     Exam:   General: NAD  HENT: head NCAT, airway patent   Eyes: anicteric, no conjunctival injection   Lungs: lungs CTAB with good inspiratory effort, no wheezing, no rhonchi, no rales  Cardiac: +S1S2, no obvious m/r/g  GI: abdomen soft with +BS, minimal suprapubic tenderness, ND  : no CVAT  MSK: ranging neck and extremities freely   Neuro: moving all extremities spontaneously, sensory grossly intact, no gross neuro deficits  Psych: normal mood and affect     A/P: 51F with concern for pyelo, pending IV abx, ?early prelim bld cx, will continue frequent re-evals

## 2022-08-28 NOTE — ED CDU PROVIDER SUBSEQUENT DAY NOTE - HISTORY
CDU PROGRESS NOTE ISAI OSEIYESENIA: No interval change. pt resting comfortably without complaint. reported generalized HA earlier consistent with her migraines, improved from tylenol/caffeine and toradol. NAD. VSS. Will continue to monitor

## 2022-08-28 NOTE — ED CDU PROVIDER DISPOSITION NOTE - ATTENDING APP SHARED VISIT CONTRIBUTION OF CARE
Attending MD Mullen:   I personally have seen and examined this patient.  Physician assistant note reviewed and agree on plan of care and except where noted.  See below for details.     Seen in CDU Jairon Vee 45    51F with PMH/PSH including s/p cervical sx, s/p C section (PATIENT DENIES HISTORY OF LIVER TRANSPLANT NOTED IN PMHX) sent to the CDU after presenting to the ED with fevers, RLQ pain, malodorous urine and dysuria.  Reports pain with urination has resolved.  Denies dizziness or lightheadedness with ambulation.  Reports improved abdominal pain including in RLQ.  Denies chest pain, shortness of breath, abdominal pain, nausea, vomiting, diarrhea, urinary complaints.     Exam:   General: NAD  HENT: head NCAT, airway patent   Eyes: anicteric, no conjunctival injection   Lungs: lungs CTAB with good inspiratory effort, no wheezing, no rhonchi, no rales  Cardiac: +S1S2, no obvious m/r/g  GI: abdomen soft with +BS, minimal suprapubic tenderness, ND  : no CVAT  MSK: ranging neck and extremities freely   Neuro: moving all extremities spontaneously, sensory grossly intact, no gross neuro deficits  Psych: normal mood and affect     A/P: 51F with concern for pyelo, patient remained afebrile, urinary complaints resolved, abd pain improved, labs and rads results reviewed with patient.  Stable for discharge. Follow up instructions given, importance of follow up emphasized, return to ED parameters reviewed and patient verbalized understanding.  All questions answered, all concerns addressed.

## 2022-08-28 NOTE — ED CDU PROVIDER DISPOSITION NOTE - CLINICAL COURSE
51y F p/w fever x 6 days. tmax 103, +urinary frequency and dysuria. Went to UC 4 days ago, was given cipro however sxs have not improved. +generalized abd pain, mostly lower abd, 5/10, dull. as well as R sided low back pain. +nausea. Pt been on Cipro 500mg BID since Tuesday. No dose today.   	pt denies any cp, palpitations, sob, v/d, headache, rash   also endorse an episode of vaginal bleeding 2 weeks ago, lasted for a few days and subsided. currently not endorsing any bleeding or discharge.  In CDU, 51y F p/w fever x 6 days. tmax 103, +urinary frequency and dysuria. Went to UC 4 days ago, was given cipro however sxs have not improved. +generalized abd pain, mostly lower abd, 5/10, dull. as well as R sided low back pain. +nausea. Pt been on Cipro 500mg BID since Tuesday. No dose today.   	pt denies any cp, palpitations, sob, v/d, headache, rash   also endorse an episode of vaginal bleeding 2 weeks ago, lasted for a few days and subsided. currently not endorsing any bleeding or discharge.  In CDU . Patient is resting comfortably and is without any complaints. Feels significantly improved since initial arrival to CDU. Plan to give next dose Ceftriaxone due this afternoon and d/c home with Pyelo dosing Cefpodoxime. Pt feels comfortable with d/c home. Discussed w/ Dr. Mullen who is agreeable to plan

## 2022-08-29 NOTE — ED POST DISCHARGE NOTE - RESULT SUMMARY
8/29/22: UCx <10k normal urogenital alex, pt tx'ed for pyelo with ceftriaxone DC'ed on cefpdoxime, recently on cipro, may be partially treated, would call to assess sxs/improvement. -Hector Marino PA-C

## 2022-08-29 NOTE — ED POST DISCHARGE NOTE - DETAILS
Left voicemail informing that I had results to discuss with the patient, gave call back number. -Hector Marino PA-C 8/30 patient feels slight dysuria but markedly improved since ED visit. advised pt to complete course of abx, strict return precautions and pmd f/u ISAI Son

## 2022-09-01 LAB
CULTURE RESULTS: SIGNIFICANT CHANGE UP
CULTURE RESULTS: SIGNIFICANT CHANGE UP
SPECIMEN SOURCE: SIGNIFICANT CHANGE UP
SPECIMEN SOURCE: SIGNIFICANT CHANGE UP

## 2023-03-07 NOTE — PATIENT PROFILE ADULT - FALL HARM RISK
surgery

## 2023-10-16 NOTE — ED CDU PROVIDER SUBSEQUENT DAY NOTE - WET READ LAUNCH FT
Medication:   Requested Prescriptions     Pending Prescriptions Disp Refills    omeprazole (PRILOSEC) 40 MG delayed release capsule [Pharmacy Med Name: OMEPRAZOLE 40MG CPDR] 30 capsule 1     Sig: TAKE ONE CAPSULE BY MOUTH EVERY MORNING BEFORE BREAKFAST     Last Filled:  8/18/2023    Last appt: 5/16/2023   Next appt: Visit date not found    Last OARRS:        No data to display There are no Wet Read(s) to document.

## 2024-01-19 NOTE — H&P PST ADULT - LAST ECHOCARDIOGRAM
I have reviewed the surgical (or preoperative) H&P that is linked to this encounter, and examined the patient. There are no significant changes    Clinical Conditions Present on Arrival:  Clinically Significant Risk Factors Present on Admission                        denies

## 2024-02-16 ENCOUNTER — APPOINTMENT (OUTPATIENT)
Dept: GASTROENTEROLOGY | Facility: CLINIC | Age: 54
End: 2024-02-16

## 2024-09-03 ENCOUNTER — APPOINTMENT (OUTPATIENT)
Dept: GASTROENTEROLOGY | Facility: CLINIC | Age: 54
End: 2024-09-03
Payer: COMMERCIAL

## 2024-09-03 VITALS
OXYGEN SATURATION: 98 % | HEART RATE: 78 BPM | HEIGHT: 67 IN | DIASTOLIC BLOOD PRESSURE: 76 MMHG | BODY MASS INDEX: 25.11 KG/M2 | RESPIRATION RATE: 16 BRPM | SYSTOLIC BLOOD PRESSURE: 110 MMHG | WEIGHT: 160 LBS

## 2024-09-03 DIAGNOSIS — M50.20 OTHER CERVICAL DISC DISPLACEMENT, UNSPECIFIED CERVICAL REGION: ICD-10-CM

## 2024-09-03 DIAGNOSIS — K59.09 OTHER CONSTIPATION: ICD-10-CM

## 2024-09-03 DIAGNOSIS — Z12.11 ENCOUNTER FOR SCREENING FOR MALIGNANT NEOPLASM OF COLON: ICD-10-CM

## 2024-09-03 PROCEDURE — 99204 OFFICE O/P NEW MOD 45 MIN: CPT

## 2024-09-03 RX ORDER — SODIUM SULFATE, POTASSIUM SULFATE AND MAGNESIUM SULFATE 1.6; 3.13; 17.5 G/177ML; G/177ML; G/177ML
17.5-3.13-1.6 SOLUTION ORAL TWICE DAILY
Qty: 2 | Refills: 0 | Status: ACTIVE | COMMUNITY
Start: 2024-09-03 | End: 1900-01-01

## 2024-09-03 RX ORDER — LINACLOTIDE 290 UG/1
290 CAPSULE, GELATIN COATED ORAL
Qty: 30 | Refills: 3 | Status: ACTIVE | COMMUNITY
Start: 2024-09-03 | End: 1900-01-01

## 2024-09-03 RX ORDER — OXYCODONE HYDROCHLORIDE AND ACETAMINOPHEN 10; 325 MG/1; MG/1
10-325 TABLET ORAL
Refills: 0 | Status: ACTIVE | COMMUNITY

## 2024-09-03 RX ORDER — TIZANIDINE HYDROCHLORIDE 2 MG/1
2 CAPSULE ORAL
Refills: 0 | Status: ACTIVE | COMMUNITY

## 2024-09-03 NOTE — ASSESSMENT
[FreeTextEntry1] : REYNA DONIS was advised to undergo colonoscopy to which she agreed. The procedure will be performed in [facility]  [facility] with the assistance of an anesthesiologist. The patient was given a Suprep preparation prescription and understood the  procedure as it was explained to her. She was given a booklet distributed by the [employer]  [employer] explaining the procedure in detail and she understood the risks of the procedure not limited to infection, bleeding, perforation or non- diagnosis of colorectal cancer. She was advised that she could not drive home, if she chooses to  receive sedation.  Further diagnostic and treatment recommendations will be based upon the procedure and any biopsies, if they are taken.  Thank you for allowing me to participate in this [facility] care.  , Best personal regards -- Don  I also prescribed Linzess 290 mcg daily   I spent 46 minutes with the patient and answered all of her questions

## 2024-09-03 NOTE — HISTORY OF PRESENT ILLNESS
[FreeTextEntry1] : She is a 53-year-old female referred for a screening colonoscopy.  She has never had a colonoscopy before.  She denies a family history of colon cancer.  She admits to chronic constipation because she is on narcotic medications for her 6 operations on her cervical spine.  She had been on Movantik, without any improvement in her symptoms .She denies abdominal pain rectal bleeding or weight loss

## 2024-09-03 NOTE — CONSULT LETTER
[Dear  ___] : Dear  [unfilled], [Consult Letter:] : I had the pleasure of evaluating your patient, [unfilled]. [( Thank you for referring [unfilled] for consultation for _____ )] : Thank you for referring [unfilled] for consultation for [unfilled] [Please see my note below.] : Please see my note below. [Consult Closing:] : Thank you very much for allowing me to participate in the care of this patient.  If you have any questions, please do not hesitate to contact me. [Sincerely,] : Sincerely, [FreeTextEntry3] : Rito Smith MD  Gastroenterology [facility]

## 2024-09-05 RX ORDER — POLYETHYLENE GLYCOL 3350 AND ELECTROLYTES WITH LEMON FLAVOR 236; 22.74; 6.74; 5.86; 2.97 G/4L; G/4L; G/4L; G/4L; G/4L
236 POWDER, FOR SOLUTION ORAL
Qty: 1 | Refills: 0 | Status: ACTIVE | COMMUNITY
Start: 2024-09-05 | End: 1900-01-01

## 2024-09-06 RX ORDER — LUBIPROSTONE 24 UG/1
24 CAPSULE ORAL
Qty: 60 | Refills: 5 | Status: ACTIVE | COMMUNITY
Start: 2024-09-05

## 2024-12-23 ENCOUNTER — APPOINTMENT (OUTPATIENT)
Dept: GASTROENTEROLOGY | Facility: AMBULATORY SURGERY CENTER | Age: 54
End: 2024-12-23
Payer: COMMERCIAL

## 2024-12-23 PROCEDURE — 45378 DIAGNOSTIC COLONOSCOPY: CPT | Mod: 53

## 2024-12-23 RX ORDER — POLYETHYLENE GLYCOL 3350 AND ELECTROLYTES WITH LEMON FLAVOR 236; 22.74; 6.74; 5.86; 2.97 G/4L; G/4L; G/4L; G/4L; G/4L
236 POWDER, FOR SOLUTION ORAL
Qty: 1 | Refills: 0 | Status: ACTIVE | COMMUNITY
Start: 2024-12-23 | End: 1900-01-01

## 2025-03-13 ENCOUNTER — RESULT REVIEW (OUTPATIENT)
Age: 55
End: 2025-03-13

## 2025-03-13 ENCOUNTER — APPOINTMENT (OUTPATIENT)
Dept: GASTROENTEROLOGY | Facility: AMBULATORY SURGERY CENTER | Age: 55
End: 2025-03-13
Payer: COMMERCIAL

## 2025-03-13 PROCEDURE — 45385 COLONOSCOPY W/LESION REMOVAL: CPT

## 2025-03-13 PROCEDURE — 45382 COLONOSCOPY W/CONTROL BLEED: CPT | Mod: 59

## 2025-06-03 ENCOUNTER — NON-APPOINTMENT (OUTPATIENT)
Age: 55
End: 2025-06-03

## 2025-06-05 ENCOUNTER — LABORATORY RESULT (OUTPATIENT)
Age: 55
End: 2025-06-05

## 2025-06-05 ENCOUNTER — APPOINTMENT (OUTPATIENT)
Dept: INTERNAL MEDICINE | Facility: CLINIC | Age: 55
End: 2025-06-05
Payer: COMMERCIAL

## 2025-06-05 VITALS
HEART RATE: 66 BPM | TEMPERATURE: 97 F | SYSTOLIC BLOOD PRESSURE: 112 MMHG | WEIGHT: 156 LBS | BODY MASS INDEX: 24.48 KG/M2 | HEIGHT: 67 IN | RESPIRATION RATE: 16 BRPM | DIASTOLIC BLOOD PRESSURE: 73 MMHG | OXYGEN SATURATION: 100 %

## 2025-06-05 DIAGNOSIS — Z00.00 ENCOUNTER FOR GENERAL ADULT MEDICAL EXAMINATION W/OUT ABNORMAL FINDINGS: ICD-10-CM

## 2025-06-05 DIAGNOSIS — Z83.3 FAMILY HISTORY OF DIABETES MELLITUS: ICD-10-CM

## 2025-06-05 DIAGNOSIS — G43.909 MIGRAINE, UNSPECIFIED, NOT INTRACTABLE, W/OUT STATUS MIGRAINOSUS: ICD-10-CM

## 2025-06-05 DIAGNOSIS — Z83.518 FAMILY HISTORY OF OTHER SPECIFIED EYE DISORDER: ICD-10-CM

## 2025-06-05 DIAGNOSIS — Z81.8 FAMILY HISTORY OF OTHER MENTAL AND BEHAVIORAL DISORDERS: ICD-10-CM

## 2025-06-05 PROCEDURE — 99386 PREV VISIT NEW AGE 40-64: CPT

## 2025-06-05 RX ORDER — SUMATRIPTAN 25 MG/1
25 TABLET, FILM COATED ORAL
Refills: 0 | Status: ACTIVE | COMMUNITY

## 2025-06-09 LAB
25(OH)D3 SERPL-MCNC: 54.6 NG/ML
ALBUMIN SERPL ELPH-MCNC: 4.7 G/DL
ALP BLD-CCNC: 58 U/L
ALT SERPL-CCNC: 19 U/L
ANION GAP SERPL CALC-SCNC: 15 MMOL/L
APPEARANCE: CLEAR
AST SERPL-CCNC: 19 U/L
BASOPHILS # BLD AUTO: 0.05 K/UL
BASOPHILS NFR BLD AUTO: 0.7 %
BILIRUB SERPL-MCNC: 0.6 MG/DL
BILIRUBIN URINE: NEGATIVE
BLOOD URINE: NEGATIVE
BUN SERPL-MCNC: 9 MG/DL
CALCIUM SERPL-MCNC: 10.3 MG/DL
CHLORIDE SERPL-SCNC: 105 MMOL/L
CHOLEST SERPL-MCNC: 201 MG/DL
CO2 SERPL-SCNC: 27 MMOL/L
COLOR: YELLOW
CREAT SERPL-MCNC: 0.81 MG/DL
EGFRCR SERPLBLD CKD-EPI 2021: 86 ML/MIN/1.73M2
EOSINOPHIL # BLD AUTO: 0.16 K/UL
EOSINOPHIL NFR BLD AUTO: 2.1 %
ESTIMATED AVERAGE GLUCOSE: 111 MG/DL
GLUCOSE QUALITATIVE U: NEGATIVE MG/DL
GLUCOSE SERPL-MCNC: 90 MG/DL
HBA1C MFR BLD HPLC: 5.5 %
HCT VFR BLD CALC: 42.3 %
HDLC SERPL-MCNC: 75 MG/DL
HGB BLD-MCNC: 13.6 G/DL
IMM GRANULOCYTES NFR BLD AUTO: 0.1 %
KETONES URINE: NEGATIVE MG/DL
LDLC SERPL-MCNC: 116 MG/DL
LEUKOCYTE ESTERASE URINE: ABNORMAL
LYMPHOCYTES # BLD AUTO: 2 K/UL
LYMPHOCYTES NFR BLD AUTO: 26.8 %
MAN DIFF?: NORMAL
MCHC RBC-ENTMCNC: 30.6 PG
MCHC RBC-ENTMCNC: 32.2 G/DL
MCV RBC AUTO: 95.1 FL
MONOCYTES # BLD AUTO: 0.41 K/UL
MONOCYTES NFR BLD AUTO: 5.5 %
NEUTROPHILS # BLD AUTO: 4.83 K/UL
NEUTROPHILS NFR BLD AUTO: 64.8 %
NITRITE URINE: NEGATIVE
NONHDLC SERPL-MCNC: 126 MG/DL
PH URINE: 7.5
PLATELET # BLD AUTO: 240 K/UL
POTASSIUM SERPL-SCNC: 4.3 MMOL/L
PROT SERPL-MCNC: 7.1 G/DL
PROTEIN URINE: NEGATIVE MG/DL
RBC # BLD: 4.45 M/UL
RBC # FLD: 12.3 %
SODIUM SERPL-SCNC: 146 MMOL/L
SPECIFIC GRAVITY URINE: 1.01
TRIGL SERPL-MCNC: 52 MG/DL
TSH SERPL-ACNC: 1.29 UIU/ML
UROBILINOGEN URINE: 0.2 MG/DL
VIT B12 SERPL-MCNC: 416 PG/ML
WBC # FLD AUTO: 7.46 K/UL

## 2025-07-01 ENCOUNTER — APPOINTMENT (OUTPATIENT)
Dept: OBGYN | Facility: CLINIC | Age: 55
End: 2025-07-01
Payer: COMMERCIAL

## 2025-07-01 VITALS
BODY MASS INDEX: 23.7 KG/M2 | DIASTOLIC BLOOD PRESSURE: 64 MMHG | SYSTOLIC BLOOD PRESSURE: 99 MMHG | HEIGHT: 67 IN | WEIGHT: 151 LBS | HEART RATE: 84 BPM

## 2025-07-01 PROBLEM — Z01.419 ENCOUNTER FOR ANNUAL ROUTINE GYNECOLOGICAL EXAMINATION: Status: ACTIVE | Noted: 2025-07-01

## 2025-07-01 PROBLEM — N95.1 VASOMOTOR SYMPTOMS DUE TO MENOPAUSE: Status: ACTIVE | Noted: 2025-07-01

## 2025-07-01 PROBLEM — N92.6 LATE MENSES: Status: RESOLVED | Noted: 2025-07-01 | Resolved: 2025-07-01

## 2025-07-01 PROBLEM — R23.2 HOT FLASHES: Status: ACTIVE | Noted: 2025-07-01

## 2025-07-01 PROBLEM — Z87.42 HISTORY OF ABNORMAL UTERINE BLEEDING: Status: RESOLVED | Noted: 2025-07-01 | Resolved: 2025-07-01

## 2025-07-01 PROBLEM — Z78.0 MENOPAUSE: Status: ACTIVE | Noted: 2025-07-01

## 2025-07-01 PROCEDURE — 99459 PELVIC EXAMINATION: CPT | Mod: NC

## 2025-07-01 PROCEDURE — 99204 OFFICE O/P NEW MOD 45 MIN: CPT | Mod: 25

## 2025-07-01 PROCEDURE — 99386 PREV VISIT NEW AGE 40-64: CPT

## 2025-07-01 PROCEDURE — 96127 BRIEF EMOTIONAL/BEHAV ASSMT: CPT

## 2025-07-01 RX ORDER — PAROXETINE HYDROCHLORIDE 10 MG/1
10 TABLET, FILM COATED ORAL
Qty: 30 | Refills: 5 | Status: ACTIVE | COMMUNITY
Start: 2025-07-01 | End: 1900-01-01

## 2025-07-07 LAB
CYTOLOGY CVX/VAG DOC THIN PREP: NORMAL
HPV HIGH+LOW RISK DNA PNL CVX: NOT DETECTED